# Patient Record
Sex: MALE | Race: WHITE | NOT HISPANIC OR LATINO | URBAN - METROPOLITAN AREA
[De-identification: names, ages, dates, MRNs, and addresses within clinical notes are randomized per-mention and may not be internally consistent; named-entity substitution may affect disease eponyms.]

---

## 2019-01-12 ENCOUNTER — EMERGENCY (EMERGENCY)
Facility: HOSPITAL | Age: 24
LOS: 1 days | Discharge: HOME | End: 2019-01-12
Admitting: EMERGENCY MEDICINE
Payer: COMMERCIAL

## 2019-01-12 VITALS
DIASTOLIC BLOOD PRESSURE: 81 MMHG | RESPIRATION RATE: 16 BRPM | OXYGEN SATURATION: 97 % | SYSTOLIC BLOOD PRESSURE: 136 MMHG | TEMPERATURE: 99 F | HEART RATE: 97 BPM

## 2019-01-12 DIAGNOSIS — R13.10 DYSPHAGIA, UNSPECIFIED: ICD-10-CM

## 2019-01-12 DIAGNOSIS — J02.9 ACUTE PHARYNGITIS, UNSPECIFIED: ICD-10-CM

## 2019-01-12 DIAGNOSIS — R50.9 FEVER, UNSPECIFIED: ICD-10-CM

## 2019-01-12 DIAGNOSIS — R09.81 NASAL CONGESTION: ICD-10-CM

## 2019-01-12 LAB — S PYO AG SPEC QL IA: NEGATIVE — SIGNIFICANT CHANGE UP

## 2019-01-12 PROCEDURE — 99283 EMERGENCY DEPT VISIT LOW MDM: CPT

## 2019-01-12 RX ORDER — DEXAMETHASONE 0.5 MG/5ML
10 ELIXIR ORAL ONCE
Qty: 0 | Refills: 0 | Status: COMPLETED | OUTPATIENT
Start: 2019-01-12 | End: 2019-01-12

## 2019-01-12 RX ADMIN — Medication 10 MILLIGRAM(S): at 14:03

## 2019-01-12 NOTE — ED PROVIDER NOTE - PROGRESS NOTE DETAILS
rapid strep negative. likely mono. advised patient to complete augmentin as prescribed and follow up with PCP in 2 days.

## 2019-01-12 NOTE — ED PROVIDER NOTE - MEDICAL DECISION MAKING DETAILS
patient with URI symptoms on augmentin x 5 days with BL throat pain x 2 days. will do rapid strep, give decadron.

## 2019-01-12 NOTE — ED ADULT NURSE NOTE - NSIMPLEMENTINTERV_GEN_ALL_ED
Implemented All Universal Safety Interventions:  [place] to call system. Call bell, personal items and telephone within reach. Instruct patient to call for assistance. Room bathroom lighting operational. Non-slip footwear when patient is off stretcher. Physically safe environment: no spills, clutter or unnecessary equipment. Stretcher in lowest position, wheels locked, appropriate side rails in place.

## 2019-01-12 NOTE — ED PROVIDER NOTE - OBJECTIVE STATEMENT
Patient presents with nasal congestion, ear fullness, fever x 5 days. had been started on augmentin 5 days ago by his PCP. fevers have resolved but BL throat pain started 2 days ago. able to tolerate PO's, denies cough, SOB, palpitations, h/a.

## 2019-01-12 NOTE — ED ADULT NURSE NOTE - CHPI ED NUR SYMPTOMS NEG
no chills/no bleeding gums/no syncope/no loss of consciousness/no nausea/no numbness/no fever/no weakness/no vomiting

## 2019-01-12 NOTE — ED PROVIDER NOTE - THROAT FINDINGS
BL tonsilar edema, no kissing tonsils/uvula midline/OROPHARYNGEAL EXUDATE/THROAT RED/TONSILLAR SWELLING

## 2020-09-14 NOTE — ED PROVIDER NOTE - MOUTH NORMAL
[facility] - Ophthalmology  Ophthalmology  [address], [address]  [place] [address]  Phone: [phone]  Fax:   Follow Up Time: Urgent     normal mucosa

## 2022-06-13 ENCOUNTER — EMERGENCY (EMERGENCY)
Facility: HOSPITAL | Age: 27
LOS: 1 days | Discharge: HOME | End: 2022-06-13
Attending: EMERGENCY MEDICINE | Admitting: EMERGENCY MEDICINE
Payer: MEDICAID

## 2022-06-13 VITALS
SYSTOLIC BLOOD PRESSURE: 120 MMHG | OXYGEN SATURATION: 97 % | WEIGHT: 149.91 LBS | RESPIRATION RATE: 15 BRPM | TEMPERATURE: 100 F | HEIGHT: 70 IN | DIASTOLIC BLOOD PRESSURE: 74 MMHG | HEART RATE: 89 BPM

## 2022-06-13 VITALS — TEMPERATURE: 100 F

## 2022-06-13 LAB
FLUAV H1 2009 PAND RNA SPEC QL NAA+PROBE: SIGNIFICANT CHANGE UP
FLUAV H1 RNA SPEC QL NAA+PROBE: SIGNIFICANT CHANGE UP
FLUAV H3 RNA SPEC QL NAA+PROBE: SIGNIFICANT CHANGE UP
FLUAV SUBTYP SPEC NAA+PROBE: SIGNIFICANT CHANGE UP
FLUBV RNA SPEC QL NAA+PROBE: SIGNIFICANT CHANGE UP
HIV 1 & 2 AB SERPL IA.RAPID: SIGNIFICANT CHANGE UP
RAPID RVP RESULT: DETECTED
RV+EV RNA SPEC QL NAA+PROBE: DETECTED
SARS-COV-2 RNA SPEC QL NAA+PROBE: SIGNIFICANT CHANGE UP

## 2022-06-13 PROCEDURE — 99284 EMERGENCY DEPT VISIT MOD MDM: CPT

## 2022-06-13 RX ORDER — CEFTRIAXONE 500 MG/1
500 INJECTION, POWDER, FOR SOLUTION INTRAMUSCULAR; INTRAVENOUS ONCE
Refills: 0 | Status: COMPLETED | OUTPATIENT
Start: 2022-06-13 | End: 2022-06-13

## 2022-06-13 RX ORDER — ACETAMINOPHEN 500 MG
650 TABLET ORAL ONCE
Refills: 0 | Status: COMPLETED | OUTPATIENT
Start: 2022-06-13 | End: 2022-06-13

## 2022-06-13 RX ORDER — MICONAZOLE NITRATE 2 %
1 CREAM (GRAM) TOPICAL
Qty: 10 | Refills: 0
Start: 2022-06-13 | End: 2022-06-19

## 2022-06-13 RX ADMIN — Medication 650 MILLIGRAM(S): at 19:26

## 2022-06-13 RX ADMIN — CEFTRIAXONE 500 MILLIGRAM(S): 500 INJECTION, POWDER, FOR SOLUTION INTRAMUSCULAR; INTRAVENOUS at 19:26

## 2022-06-13 NOTE — ED ADULT NURSE NOTE - OBJECTIVE STATEMENT
Patient presents with multiple complaints: C/o productive cough (yellow greenish phlegm), sinus pain, nasal congestion, rhinorrhea, noticed rash to the left armpit yesterday.

## 2022-06-13 NOTE — ED PROVIDER NOTE - OBJECTIVE STATEMENT
27yo M hx of gonorrhea, treated in past, presents with cough productive of yellow to green sputum x3wks without associated fever or chills.  No sore throat.  States he also wants STI testing and empiric treatment for gonorrhea and chlamydia as he heard that a female sexual partner of his said she got gonorrhea from him.  No penile discharge.  No dysuria or testicular pain.  Pt states that after drinking etoh heavily, he had a day of nausea, vomiting, and diarrhea 3d ago, but this has since resolved.  No abd pain.  Vaccinated for COVID.  Pt also reports red itchy rash in L armpit which started after using an old deodorant.

## 2022-06-13 NOTE — ED PROVIDER NOTE - NS ED ROS FT
Constitutional:  No fever, No chills, No night sweats  Eyes:  No visual changes, No discharge, No redness  ENMT:  No epistaxis, no nasal congestion, no throat pain, no difficulty swallowing  CV:  No chest pain, No palpitations, No peripheral edema  Resp:  +cough, No shortness of breath  GI:  No abdominal pain, No vomiting, No diarrhea  MSK:  No neck pain or stiffness, No joint swelling or pain, No back pain  Neuro: no loss of consciousness, no gait abnormality, no headache, no sensory deficits, and no weakness.  Skin:  No abrasions, no lesions, no rashes  Psych:  No known mental health issues

## 2022-06-13 NOTE — ED ADULT TRIAGE NOTE - CHIEF COMPLAINT QUOTE
productive cough, chest congestion, sinus congestion, and an axillary rash x 2-3 weeks productive cough, chest congestion, sinus congestion, and an axillary rash x 2-3 weeks, also requesting std/sti testing

## 2022-06-13 NOTE — ED PROVIDER NOTE - PHYSICAL EXAMINATION
Constitutional:  Well appearing, awake, alert, oriented to person, place, time/situation and in no apparent distress.  HEENT: Airway patent, Nasal mucosa clear. Mouth with normal mucosa. no tonsillar swelling or exudates.  Eyes: Clear bilaterally, pupils equal, round and reactive to light.  Cardiac: Normal rate, regular rhythm.  Respiratory: Breath sounds clear and equal bilaterally.  GI: Abdomen soft, non-tender, no guarding.  MSK: Spine appears normal, range of motion is not limited, no muscle or joint tenderness  Neuro: Alert and oriented, no focal deficits, no motor or sensory deficits.  Skin: Skin normal color for race, warm, dry and intact. blanching erythematous macular rash to L axilla; no scaling, no serpiginous border, no ulcerated lesions

## 2022-06-13 NOTE — ED PROVIDER NOTE - CLINICAL SUMMARY MEDICAL DECISION MAKING FREE TEXT BOX
25yo M hx of gonorrhea, treated in past, presents with numerous complaints including:  -productive cough for 3wks; likely viral URI, but duration of symptoms concerning for bacterial superinfection.  will give PO abx.  will send viral panel.  -rash to L axilla which started after using old deodorant; likely contact dermatitis given isolated area affected, but possible fungal component as well. pt has been using topical cortisone without improvement. told to hold deodorant entirely until rash resolves. will give topical antifungal. instructed to keep area clean and dry.  -request for STI testing and treatment. Will send gc/chlamydia, give IM ceftriaxone, and dc w PO doxy (which will cover respiratory infection as well). Will send and syphilis testing as well; pt wants to wait for results before syphilis treatment.

## 2022-06-13 NOTE — ED ADULT NURSE NOTE - CHIEF COMPLAINT QUOTE
productive cough, chest congestion, sinus congestion, and an axillary rash x 2-3 weeks, also requesting std/sti testing

## 2022-06-13 NOTE — ED PROVIDER NOTE - PATIENT PORTAL LINK FT
You can access the FollowMyHealth Patient Portal offered by [facility] by registering at the following website: [email or web address]. By joining [employer]’s FollowMyHealth portal, you will also be able to view your health information using other applications (apps) compatible with our system.

## 2022-06-13 NOTE — ED PROVIDER NOTE - NSFOLLOWUPINSTRUCTIONS_ED_ALL_ED_FT
Acute Bronchitis, Adult    A comparison between normal and swollen bronchi air tubes in the lungs.   Acute bronchitis is when air tubes in the lungs (bronchi) suddenly get swollen. The condition can make it hard for you to breathe. In adults, acute bronchitis usually goes away within 2 weeks. A cough caused by bronchitis may last up to 3 weeks. Smoking, allergies, and asthma can make the condition worse.      What are the causes?    This condition is caused by:  •Cold and flu viruses. The most common cause of this condition is the virus that causes the common cold.       •Bacteria.     •Substances that irritate the lungs, including:   •Smoke from cigarettes and other types of tobacco.      •Dust and pollen.       •Fumes from chemicals, gases, or burned fuel.      •Other materials that pollute indoor or outdoor air.         •Close contact with someone who has acute bronchitis.        What increases the risk?    The following factors may make you more likely to develop this condition:  •A weak body's defense system. This is also called the immune system.       •Any condition that affects your lungs and breathing, such as asthma.        What are the signs or symptoms?    Symptoms of this condition include:  •A cough.      •Coughing up clear, yellow, or green mucus.      •Wheezing.      •Having too much mucus in your lungs (chest congestion).      •Shortness of breath.      •A fever.      •Chills.      •Body aches.      •A sore throat.        How is this treated?    Acute bronchitis may go away over time without treatment. Your doctor may recommend:  •Drinking more fluids.       •Using a device that gets medicine into your lungs (inhaler).      •Using a vaporizer or a humidifier. These are machines that add water or moisture to the air. This helps with coughing and poor breathing.      •Taking a medicine for fever.      •Taking a medicine that thins mucus and clears congestion.      •Taking a medicine that prevents or stops coughing.        Follow these instructions at home:    Activity     •Get a lot of rest.      •Return to your normal activities as told by your doctor. Ask your doctor what activities are safe for you.        Lifestyle   A comparison of three sample cups showing dark yellow, yellow, and pale yellow pee.   •Drink enough fluid to keep your pee (urine) pale yellow.      • Do not drink alcohol.     • Do not use any products that contain nicotine or tobacco, such as cigarettes, e-cigarettes, and chewing tobacco. If you need help quitting, ask your doctor. Be aware that:  •Your bronchitis will get worse if you smoke or breathe in other people's smoke (secondhand smoke).      •Your lungs will heal faster if you quit smoking.        General instructions     •Take over-the-counter and prescription medicines only as told by your doctor.      •Use an inhaler, cool mist vaporizer, or humidifier as told by your doctor.      •Rinse your mouth often with salt water. To make salt water, dissolve ½–1 tsp (3–6 g) of salt in 1 cup (237 mL) of warm water.      •Take two teaspoons of honey at bedtime. This helps lessen your coughing at night.      •Keep all follow-up visits as told by your doctor. This is important.        How is this prevented?  Washing hands with soap and water.   To lower your risk of getting this condition again:  •Wash your hands often with soap and water. If you cannot use soap and water, use hand .      •Avoid contact with people who have cold symptoms.      •Try not to touch your mouth, nose, or eyes with your hands.      •Make sure to get the flu shot every year.        Contact a doctor if:    •Your symptoms do not get better in 2 weeks.      •You vomit more than once or twice.     •You have symptoms of loss of fluid from your body (dehydration). These include:   •Dark pee.      •Dry skin or eyes.      •Increased thirst.       •Headaches.       •Confusion.      •Muscle cramps.          Get help right away if:    •You cough up blood.      •You have chest pain.      •You have very bad shortness of breath.      •You become dehydrated.      •You faint or keep feeling like you are going to faint.      •You have a very bad headache.      •Your fever or chills get worse.      These symptoms may be an emergency. Get help right away. Call your local emergency services (911 in the [place]    • Do not wait to see if the symptoms will go away.       • Do not drive yourself to the hospital.         Summary    •Acute bronchitis is when air tubes in the lungs (bronchi) suddenly get swollen. In adults, acute bronchitis usually goes away within 2 weeks.      •Take over-the-counter and prescription medicines only as told by your doctor.      •Drink enough fluid to keep your pee (urine) pale yellow.      •Contact a doctor if your symptoms do not improve after 2 weeks of treatment.      •Get help right away if you cough up blood, faint, or have chest pain or shortness of breath.      This information is not intended to replace advice given to you by your health care provider. Make sure you discuss any questions you have with your health care provider.        Contact Dermatitis      Dermatitis is redness, soreness, and swelling (inflammation) of the skin. Contact dermatitis is a reaction to something that touches the skin.    There are two types of contact dermatitis:  •Irritant contact dermatitis. This happens when something bothers (irritates) your skin, like soap.      •Allergic contact dermatitis. This is caused when you are exposed to something that you are allergic to, such as poison ivy.        What are the causes?  •Common causes of irritant contact dermatitis include:  •Makeup.      •Soaps.      •Detergents.      •Bleaches.      •Acids.      •Metals, such as nickel.      •Common causes of allergic contact dermatitis include:  •Plants.      •Chemicals.      •Jewelry.      •Latex.      •Medicines.      •Preservatives in products, such as clothing.          What increases the risk?    •Having a job that exposes you to things that bother your skin.      •Having asthma or eczema.        What are the signs or symptoms?     Symptoms may happen anywhere the irritant has touched your skin. Symptoms include:  •Dry or flaky skin.      •Redness.      •Cracks.      •Itching.      •Pain or a burning feeling.      •Blisters.      •Blood or clear fluid draining from skin cracks.      With allergic contact dermatitis, swelling may occur. This may happen in places such as the eyelids, mouth, or genitals.      How is this treated?  •This condition is treated by checking for the cause of the reaction and protecting your skin. Treatment may also include:  •Steroid creams, ointments, or medicines.      •Antibiotic medicines or other ointments, if you have a skin infection.      •Lotion or medicines to help with itching.      •A bandage (dressing).          Follow these instructions at home:    Skin care     •Moisturize your skin as needed.      •Put cool cloths on your skin.      •Put a baking soda paste on your skin. Stir water into baking soda until it looks like a paste.      • Do not scratch your skin.      •Avoid having things rub up against your skin.      •Avoid the use of soaps, perfumes, and dyes.      Medicines     •Take or apply over-the-counter and prescription medicines only as told by your doctor.      •If you were prescribed an antibiotic medicine, take or apply it as told by your doctor. Do not stop using it even if your condition starts to get better.      Bathing   •Take a bath with:  •Epsom salts.      •Baking soda.      •Colloidal oatmeal.        •Bathe less often.      •Bathe in warm water. Avoid using hot water.      Bandage care     •If you were given a bandage, change it as told by your health care provider.      •Wash your hands with soap and water before and after you change your bandage. If soap and water are not available, use hand .      General instructions   •Avoid the things that caused your reaction. If you do not know what caused it, keep a journal. Write down:  •What you eat.      •What skin products you use.      •What you drink.      •What you wear in the area that has symptoms. This includes jewelry.      •Check the affected areas every day for signs of infection. Check for:  •More redness, swelling, or pain.      •More fluid or blood.      •Warmth.      •Pus or a bad smell.        •Keep all follow-up visits as told by your doctor. This is important.        Contact a doctor if:    •You do not get better with treatment.      •Your condition gets worse.    •You have signs of infection, such as:  •More swelling.      •Tenderness.      •More redness.      •Soreness.      •Warmth.        •You have a fever.      •You have new symptoms.        Get help right away if:    •You have a very bad headache.      •You have neck pain.      •Your neck is stiff.      •You throw up (vomit).      •You feel very sleepy.      •You see red streaks coming from the area.      •Your bone or joint near the area hurts after the skin has healed.      •The area turns darker.      •You have trouble breathing.        Summary    •Dermatitis is redness, soreness, and swelling of the skin.      •Symptoms may occur where the irritant has touched you.      •Treatment may include medicines and skin care.      •If you do not know what caused your reaction, keep a journal.      •Contact a doctor if your condition gets worse or you have signs of infection.      This information is not intended to replace advice given to you by your health care provider. Make sure you discuss any questions you have with your health care provider.          Sexually Transmitted Diseases    WHAT YOU NEED TO KNOW:    A sexually transmitted disease (STD) means signs or symptoms of a sexually transmitted infection (STI) have developed. An STI happens when bacteria or a virus are spread through oral, genital, or anal sex. Some examples of STDs are HIV, chlamydia, syphilis, and gonorrhea.    DISCHARGE INSTRUCTIONS:    Return to the emergency department if:   •You have genital swelling or pain, or unusual bleeding.      •You have joint pain, a rash, swollen lymph nodes, or night sweats.      •You have severe abdominal pain.      Call your doctor if:   •You have a fever.      •Your symptoms do not go away or get worse, even after treatment.      •You have bleeding or pain during sex.      •You or your female partner may be pregnant.      •You have questions or concerns about your condition or care.      Medicines: You may need any of the following:   •Antibiotics may be given for a bacterial infection.      •Antivirals may be given for a viral infection.      •Antifungals may be given for a fungal infection, such as a yeast infection.      •Take your medicine as directed. Contact your healthcare provider if you think your medicine is not helping or if you have side effects. Tell him of her if you are allergic to any medicine. Keep a list of the medicines, vitamins, and herbs you take. Include the amounts, and when and why you take them. Bring the list or the pill bottles to follow-up visits. Carry your medicine list with you in case of an emergency.      Help prevent the spread of an STI: Ask your healthcare provider for more information about the following safe sex practices:  •Use a male or female condom during sex. This includes oral, genital, or anal sex. Use a new condom each time. Condoms help prevent pregnancy and STIs. Use latex condoms, if possible. Lambskin (also called sheepskin or natural membrane) condoms do not protect against STIs. A polyurethane condom can be used if you or your partner is allergic to latex. Condoms should be used with a second form of birth control to help prevent pregnancy and STIs. Do not use male and female condoms together.      •Do not have sex with someone who has an STI or STD. This includes oral and anal sex.      •Limit sex partners. Ask about your partner's sex history before you have sex.      •Get screened regularly if you are sexually active. Common tests include chlamydia, gonorrhea, HIV, hepatitis, and syphilis.      •Tell your sex partners if you have an STI. Your partners may need to be tested and treated. Do not have sex while you are being treated for an STI. Do not have sex with a partner who is being treated.      •Ask about medicines to lower your risk for some STIs: ?Vaccines can help protect you from hepatitis A, hepatitis B, and the human papillomavirus (HPV). The HPV vaccine is usually given at 11 years, but it may be given through 26 years to both females and males. Your provider can give you more information on vaccines to prevent STIs.      ?Pre-exposure prophylaxis (PrEP) may be given if you are at high risk for HIV. PrEP is taken every day to prevent the virus from fully infecting the body.      •If you are a woman, do not douche. Douching upsets the normal balance of bacteria found in your vagina. It does not prevent or clear up vaginal infections.      Follow up with your doctor as directed: You may need more tests. If you have an STD, you may need immediate or ongoing treatment. Your doctor may also refer you to a specialist who can provide specific treatment. Write down your questions so you remember to ask them during your visits.

## 2022-06-14 LAB
C TRACH RRNA SPEC QL NAA+PROBE: SIGNIFICANT CHANGE UP
N GONORRHOEA RRNA SPEC QL NAA+PROBE: SIGNIFICANT CHANGE UP
T PALLIDUM AB TITR SER: NEGATIVE — SIGNIFICANT CHANGE UP

## 2022-06-15 DIAGNOSIS — R05.8 OTHER SPECIFIED COUGH: ICD-10-CM

## 2022-06-15 DIAGNOSIS — Z20.822 CONTACT WITH AND (SUSPECTED) EXPOSURE TO COVID-19: ICD-10-CM

## 2022-06-15 DIAGNOSIS — R19.7 DIARRHEA, UNSPECIFIED: ICD-10-CM

## 2022-06-15 DIAGNOSIS — R11.2 NAUSEA WITH VOMITING, UNSPECIFIED: ICD-10-CM

## 2022-08-30 ENCOUNTER — EMERGENCY (EMERGENCY)
Facility: HOSPITAL | Age: 27
LOS: 1 days | Discharge: HOME | End: 2022-08-30
Attending: EMERGENCY MEDICINE | Admitting: EMERGENCY MEDICINE

## 2022-08-30 VITALS
DIASTOLIC BLOOD PRESSURE: 84 MMHG | SYSTOLIC BLOOD PRESSURE: 135 MMHG | RESPIRATION RATE: 15 BRPM | TEMPERATURE: 98 F | HEART RATE: 96 BPM | WEIGHT: 154.32 LBS | OXYGEN SATURATION: 99 % | HEIGHT: 70 IN

## 2022-08-30 DIAGNOSIS — J32.9 CHRONIC SINUSITIS, UNSPECIFIED: ICD-10-CM

## 2022-08-30 DIAGNOSIS — R09.81 NASAL CONGESTION: ICD-10-CM

## 2022-08-30 PROCEDURE — 99283 EMERGENCY DEPT VISIT LOW MDM: CPT

## 2022-08-30 RX ORDER — MUPIROCIN 20 MG/G
1 OINTMENT TOPICAL
Qty: 15 | Refills: 0
Start: 2022-08-30

## 2022-08-30 NOTE — ED PROVIDER NOTE - PATIENT PORTAL LINK FT
You can access the FollowMyHealth Patient Portal offered by [facility] by registering at the following website: [email or web address]. By joining [employer]’s FollowMyHealth portal, you will also be able to view your health information using other applications (apps) compatible with our system.

## 2022-08-30 NOTE — ED ADULT TRIAGE NOTE - CHIEF COMPLAINT QUOTE
patient walk in c/o sinus infection x3 weeks; has not seen an ENT or MD for this; not on any medication; also c/o "a bump inside my right nostril that keeps coming back"

## 2022-08-30 NOTE — ED PROVIDER NOTE - NS ED ROS FT
· CONSTITUTIONAL: no fever and no chills.  · RESPIRATORY: no sob, no cough  · HEENT: +nasal congestion, +sinus pressure  · GASTROINTESTINAL: no abdominal pain, no nausea and no vomiting.  · MUSCULOSKELETAL: no musculoskeletal pain  · SKIN: no abrasions, no rashes  · NEURO: no headache, no weakness  All other systems negative

## 2022-08-30 NOTE — ED PROVIDER NOTE - PHYSICAL EXAMINATION
VITAL SIGNS: I have reviewed nursing notes and confirm.  CONSTITUTIONAL: Well-developed; well-nourished; in no acute distress.  SKIN: No rash on visible skin  HEAD: Normocephalic; atraumatic.  EYES: EOM intact; conjunctiva and sclera clear.  ENT: nose appears normal, right septum with excoration  NECK: Supple, no cervical lymphadenopathy  CARD: S1, S2 normal; no murmurs, gallops, or rubs. Regular rate and rhythm.  RESP: No wheezes, rales or rhonchi.  EXT: Normal ROM. No clubbing, cyanosis or edema.  NEURO: Alert, oriented. Grossly unremarkable.  PSYCH: Cooperative, appropriate.

## 2022-08-30 NOTE — ED PROVIDER NOTE - NSFOLLOWUPINSTRUCTIONS_ED_ALL_ED_FT
Please use a nasal saline irrigation system (Neilmed) twice daily.  Please take the prescribed antibiotic for 1 week.  Please use the ointment to the right side of the nose or 7-14 days until the lesion in the nose has resolved completely. Please use a nasal saline irrigation system (Neilmed) twice daily.  Please take the prescribed antibiotic for 1 week.  Please use the ointment to the right side of the nose or 7-14 days until the lesion in the nose has resolved completely.    Please follow up with a primary care doctor:    [facility]  [phone]

## 2022-08-30 NOTE — ED PROVIDER NOTE - OBJECTIVE STATEMENT
25yo M presenting complaining of 3 weeks of nasal congestion productive of thick greenish discharge.  He developed pressure between the eyes today.  No fevers.  He has also noticed a bump inside the nose on the right septum that has been present for a few weeks.  He has had sinus infections in the past that have required antibiotics.  He has not tried taking any medications for his current symptoms.

## 2022-08-30 NOTE — ED PROVIDER NOTE - CLINICAL SUMMARY MEDICAL DECISION MAKING FREE TEXT BOX
25yo M with symptoms concerning for bacterial sinusitis given the duration of symptoms.  Nontoxic appearing.  Will treat with a course of augmentin and recommend nasal saline irrigation and will also prescribe mupiricin for possible MRSA infection to the nasal septum.

## 2022-12-14 ENCOUNTER — EMERGENCY (EMERGENCY)
Facility: HOSPITAL | Age: 27
LOS: 1 days | Discharge: HOME | End: 2022-12-14
Attending: EMERGENCY MEDICINE | Admitting: EMERGENCY MEDICINE

## 2022-12-14 VITALS
HEART RATE: 90 BPM | RESPIRATION RATE: 18 BRPM | WEIGHT: 160.06 LBS | TEMPERATURE: 99 F | HEIGHT: 70 IN | OXYGEN SATURATION: 98 % | SYSTOLIC BLOOD PRESSURE: 133 MMHG | DIASTOLIC BLOOD PRESSURE: 78 MMHG

## 2022-12-14 DIAGNOSIS — J02.9 ACUTE PHARYNGITIS, UNSPECIFIED: ICD-10-CM

## 2022-12-14 DIAGNOSIS — R59.0 LOCALIZED ENLARGED LYMPH NODES: ICD-10-CM

## 2022-12-14 LAB
BASOPHILS # BLD AUTO: 0.04 K/UL — SIGNIFICANT CHANGE UP (ref 0–0.2)
BASOPHILS NFR BLD AUTO: 0.5 % — SIGNIFICANT CHANGE UP (ref 0–2)
EOSINOPHIL # BLD AUTO: 0.09 K/UL — SIGNIFICANT CHANGE UP (ref 0–0.5)
EOSINOPHIL NFR BLD AUTO: 1.1 % — SIGNIFICANT CHANGE UP (ref 0–6)
HCT VFR BLD CALC: 46.7 % — SIGNIFICANT CHANGE UP (ref 39–50)
HGB BLD-MCNC: 15.2 G/DL — SIGNIFICANT CHANGE UP (ref 13–17)
HIV 1 & 2 AB SERPL IA.RAPID: SIGNIFICANT CHANGE UP
IMM GRANULOCYTES NFR BLD AUTO: 0.3 % — SIGNIFICANT CHANGE UP (ref 0–0.9)
LYMPHOCYTES # BLD AUTO: 1.87 K/UL — SIGNIFICANT CHANGE UP (ref 1–3.3)
LYMPHOCYTES # BLD AUTO: 23.6 % — SIGNIFICANT CHANGE UP (ref 13–44)
MCHC RBC-ENTMCNC: 31.4 PG — SIGNIFICANT CHANGE UP (ref 27–34)
MCHC RBC-ENTMCNC: 32.5 GM/DL — SIGNIFICANT CHANGE UP (ref 32–36)
MCV RBC AUTO: 96.5 FL — SIGNIFICANT CHANGE UP (ref 80–100)
MONOCYTES # BLD AUTO: 0.66 K/UL — SIGNIFICANT CHANGE UP (ref 0–0.9)
MONOCYTES NFR BLD AUTO: 8.3 % — SIGNIFICANT CHANGE UP (ref 2–14)
NEUTROPHILS # BLD AUTO: 5.25 K/UL — SIGNIFICANT CHANGE UP (ref 1.8–7.4)
NEUTROPHILS NFR BLD AUTO: 66.2 % — SIGNIFICANT CHANGE UP (ref 43–77)
NRBC # BLD: 0 /100 WBCS — SIGNIFICANT CHANGE UP (ref 0–0)
PLATELET # BLD AUTO: 230 K/UL — SIGNIFICANT CHANGE UP (ref 150–400)
RBC # BLD: 4.84 M/UL — SIGNIFICANT CHANGE UP (ref 4.2–5.8)
RBC # FLD: 12.3 % — SIGNIFICANT CHANGE UP (ref 10.3–14.5)
S PYO AG SPEC QL IA: NEGATIVE — SIGNIFICANT CHANGE UP
WBC # BLD: 7.93 K/UL — SIGNIFICANT CHANGE UP (ref 3.8–10.5)
WBC # FLD AUTO: 7.93 K/UL — SIGNIFICANT CHANGE UP (ref 3.8–10.5)

## 2022-12-14 PROCEDURE — 99284 EMERGENCY DEPT VISIT MOD MDM: CPT

## 2022-12-14 NOTE — ED ADULT TRIAGE NOTE - CHIEF COMPLAINT QUOTE
Pt walks in c/o B/L swollen neck lymph nodes for 2 months. Pt denies any additional symptoms. Pt denies difficulty breathing.

## 2022-12-14 NOTE — ED PROVIDER NOTE - PATIENT PORTAL LINK FT
You can access the FollowMyHealth Patient Portal offered by [facility] by registering at the following website: [email or web address]. By joining [employer]’s FollowMyHealth portal, you will also be able to view your health information using other applications (apps) compatible with our system.

## 2022-12-14 NOTE — ED PROVIDER NOTE - OBJECTIVE STATEMENT
28 y/o M with no significant PMHx presents to ED c/o R sided cervical lymphadenopathy and some mild sore throat for 1-2 weeks, unchanged. No fever. Pt endorsing some mild rhinorrhea. No cough and no other painful lymph nodes.

## 2022-12-14 NOTE — ED ADULT NURSE NOTE - OBJECTIVE STATEMENT
Pt walked in c/o of right swollen lymph node and pain when swallowing x 2 weeks. PT A&Ox3 speaking in full clear sentences. No drooling. Airway patent
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
## 2022-12-14 NOTE — ED PROVIDER NOTE - PHYSICAL EXAMINATION
VITAL SIGNS: I have reviewed nursing notes and confirm.  CONSTITUTIONAL: Well-developed; well-nourished; in no acute distress.  SKIN: Skin is warm and dry.  HEAD: Normocephalic; atraumatic.  EYES: Clear bilaterally.  ENT: No tonsilar enlargement or exudates. Midline uvula.   NECK: Right sided cervical lymphadenopathy.    CARD: Regular rate and rhythm.  RESP: No respiratory distress.  ABD: Soft; non-distended; non-tender.  MSK: Normal ROM. No clubbing, cyanosis or edema.  NEURO: Alert, oriented. Grossly unremarkable.  PSYCH: Cooperative, appropriate.

## 2022-12-14 NOTE — ED PROVIDER NOTE - NSFOLLOWUPINSTRUCTIONS_ED_ALL_ED_FT
Lymphadenopathy    WHAT YOU NEED TO KNOW:    Lymphadenopathy is swelling of your lymph nodes. Lymph nodes are small organs that are part of your immune system. The lymph nodes are found throughout your body. They are most easily felt in your neck, under your arms, and near your groin. Lymphadenopathy can occur in one or more areas of your body. It is usually caused by an infection.    DISCHARGE INSTRUCTIONS:    Return to the emergency department if:   •The swollen lymph nodes bleed.      •You have swollen lymph nodes in your neck that affect your breathing or swallowing.      Contact your healthcare provider if:   •You have a fever.      •You have a new swollen and painful lymph node.      •You have a skin rash.      •Your lymph node remains swollen or painful, or it gets bigger.      •Your lymph node has red streaks around it, or the skin around the lymph node is red.      •You have questions or concerns about your condition or care.      Follow up with your doctor as directed: Write down your questions so you remember to ask them during your visits.    Self-care:   •Do not poke or squeeze the swollen lymph nodes.      •Apply heat to the swollen glands. You may use warm compresses, or an electric heating pad set on low.       •Rest as needed. If you have a fever, rest until your temperature returns to normal. Return to your normal daily activities slowly after your fever is gone.

## 2022-12-16 LAB
CULTURE RESULTS: SIGNIFICANT CHANGE UP
SPECIMEN SOURCE: SIGNIFICANT CHANGE UP

## 2022-12-30 ENCOUNTER — EMERGENCY (EMERGENCY)
Facility: HOSPITAL | Age: 27
LOS: 1 days | Discharge: HOME | End: 2022-12-30
Admitting: EMERGENCY MEDICINE
Payer: MEDICAID

## 2022-12-30 VITALS
TEMPERATURE: 98 F | DIASTOLIC BLOOD PRESSURE: 80 MMHG | RESPIRATION RATE: 18 BRPM | HEIGHT: 70 IN | HEART RATE: 113 BPM | OXYGEN SATURATION: 97 % | SYSTOLIC BLOOD PRESSURE: 128 MMHG | WEIGHT: 160.06 LBS

## 2022-12-30 PROCEDURE — 99282 EMERGENCY DEPT VISIT SF MDM: CPT

## 2022-12-30 RX ORDER — MUPIROCIN 20 MG/G
1 OINTMENT TOPICAL
Refills: 0 | Status: DISCONTINUED | OUTPATIENT
Start: 2022-12-30 | End: 2023-01-03

## 2022-12-30 RX ADMIN — MUPIROCIN 1 APPLICATION(S): 20 OINTMENT TOPICAL at 16:52

## 2022-12-30 NOTE — ED PROVIDER NOTE - OBJECTIVE STATEMENT
26 y/o M with no PMH presents complaining of irritation inside of nostrils after sniffing cocaine last week. Patient requesting evaluation and antibiotic ointment for the area. Denies fever, chills.

## 2022-12-30 NOTE — ED PROVIDER NOTE - CLINICAL SUMMARY MEDICAL DECISION MAKING FREE TEXT BOX
28 y/o M presents for nostril irritation in the setting of sniffing cocaine. On exam nasal irritation but no septal hematoma or hole in septum identified. Will give nasal spray for symptom relief.

## 2022-12-30 NOTE — ED ADULT NURSE NOTE - OBJECTIVE STATEMENT
Pt aox3 with steady gait. "I have a sore in my nose and I need that water based antibiotic for it". Denies fevers or nasal drainage. PT states from snorting cocaine. Denies breathing problems

## 2022-12-30 NOTE — ED PROVIDER NOTE - PATIENT PORTAL LINK FT
You can access the FollowMyHealth Patient Portal offered by [facility] by registering at the following website: [email or web address]. By joining [facility]’s FollowMyHealth portal, you will also be able to view your health information using other applications (apps) compatible with our system.

## 2022-12-30 NOTE — ED ADULT TRIAGE NOTE - CHIEF COMPLAINT QUOTE
Pt walk in complaining of nasal sores for one week secondary to snorting cocaine. Denies bleeding or drainage. Pt walk in complaining of nasal sores for one week secondary to snorting cocaine. Denies bleeding or drainage. Denies any illicit drug use PTA.

## 2022-12-30 NOTE — ED ADULT NURSE NOTE - CHIEF COMPLAINT QUOTE
Pt walk in complaining of nasal sores for one week secondary to snorting cocaine. Denies bleeding or drainage. Denies any illicit drug use PTA.

## 2022-12-30 NOTE — ED PROVIDER NOTE - IV ALTEPLASE INCLUSION HIDDEN
----- Message from Liss Yañez MA sent at 10/24/2017 11:32 AM CDT -----  Good morning,     Dr. Brooke is referring this pt. Is there any way that Dr. Braga can see this pt before the first available appt on 11/15 ?   Pt has breast pain where her heart device is implanted, but no longer feels the heart device. She complains of a lump on her nipple that is painful to the touch.    Thanks in advance,     Liss  Ext [phone]   show

## 2023-01-02 DIAGNOSIS — R68.89 OTHER GENERAL SYMPTOMS AND SIGNS: ICD-10-CM

## 2023-02-11 ENCOUNTER — EMERGENCY (EMERGENCY)
Facility: HOSPITAL | Age: 28
LOS: 1 days | Discharge: HOME | End: 2023-02-11
Attending: EMERGENCY MEDICINE | Admitting: EMERGENCY MEDICINE
Payer: MEDICAID

## 2023-02-11 VITALS
HEIGHT: 70 IN | OXYGEN SATURATION: 96 % | SYSTOLIC BLOOD PRESSURE: 135 MMHG | DIASTOLIC BLOOD PRESSURE: 76 MMHG | RESPIRATION RATE: 18 BRPM | HEART RATE: 94 BPM | TEMPERATURE: 98 F

## 2023-02-11 LAB
FLUAV AG NPH QL: SIGNIFICANT CHANGE UP
FLUBV AG NPH QL: SIGNIFICANT CHANGE UP
RSV RNA NPH QL NAA+NON-PROBE: SIGNIFICANT CHANGE UP
SARS-COV-2 RNA SPEC QL NAA+PROBE: SIGNIFICANT CHANGE UP

## 2023-02-11 PROCEDURE — 99284 EMERGENCY DEPT VISIT MOD MDM: CPT

## 2023-02-11 RX ORDER — IBUPROFEN 200 MG
1 TABLET ORAL
Qty: 20 | Refills: 0
Start: 2023-02-11

## 2023-02-11 RX ADMIN — Medication 40 MILLIGRAM(S): at 14:17

## 2023-02-11 NOTE — ED PROVIDER NOTE - OBJECTIVE STATEMENT
Patient reports he woke up 2 days ago with a sore throat. Also developed rhinorrhea and a dry cough. last night lost his voice. Afterwards, tried to vape nicotine, and felt short of breath afterwards. stated he felt like shortness of breath was coming from burning pain and inflammation in his throat, not discomfort in his chest. Took a friend's albuterol inhaler without relief. No fever, cp, ap, n/v/d, rash. States he only vapes commercial nicotine cartridges, no homemade cartridges or oils. No longer feels short of breath.

## 2023-02-11 NOTE — ED ADULT TRIAGE NOTE - CHIEF COMPLAINT QUOTE
Pt walks in c/o sore throat with intermittent shortness of breath for 2 days. Pt states he was smoking marijuana last night when he began to become short of breath, pt took friends asthma inhaler without relief. Pt is a daily vape user.

## 2023-02-11 NOTE — ED PROVIDER NOTE - TEST CONSIDERED BUT NOT PERFORMED
Tests Considered But Not Performed chest x-ray: patient had normal pulmonary exam, respiratory rate, and oxygen saturation

## 2023-02-11 NOTE — ED ADULT NURSE NOTE - OBJECTIVE STATEMENT
pt aox3 with steady gait. c/o sore throat with intermittent shortness of breath for 2 days. Pt states he was smoking marijuana last night when he began to become short of breath, pt took friends asthma inhaler without relief. Pt is a daily vape user.
agitation

## 2023-02-11 NOTE — ED PROVIDER NOTE - WET READ LAUNCH FT
There are no Wet Read(s) to document. Ear Star Wedge Flap Text: The defect edges were debeveled with a #15 blade scalpel.  Given the location of the defect and the proximity to free margins (helical rim) an ear star wedge flap was deemed most appropriate.  Using a sterile surgical marker, the appropriate flap was drawn incorporating the defect and placing the expected incisions between the helical rim and antihelix where possible.  The area thus outlined was incised through and through with a #15 scalpel blade.

## 2023-02-11 NOTE — ED PROVIDER NOTE - CLINICAL SUMMARY MEDICAL DECISION MAKING FREE TEXT BOX
Patient with sore throat, cough, rhinorrhea, temporary shortness of breath. Visible pharyngitis on exam. Will tx with steroids and NSAIDs, mostly likely viral. PE unlikely given other viral symptoms and lack of tachycardia, tachypnea, hypoxia. PERC zero.

## 2023-02-11 NOTE — ED PROVIDER NOTE - NSFOLLOWUPINSTRUCTIONS_ED_ALL_ED_FT
Pharyngitis    WHAT YOU NEED TO KNOW:    Pharyngitis, or sore throat, is inflammation of the tissues and structures in your pharynx (throat). Pharyngitis is most often caused by bacteria or a virus. Other causes include smoking, allergies, or acid reflux.    DISCHARGE INSTRUCTIONS:    Call your local emergency number (911 in the ) if:   •You have trouble breathing or swallowing because your throat is swollen.          Return to the emergency department if:   •You are drooling because it hurts too much to swallow.      •Your fever is higher than 102°F (39°C) or lasts longer than 3 days.      •You are confused.      •You taste blood.      Call your doctor if:   •Your throat pain gets worse.      •You have a painful lump in your throat that does not go away after 5 days.      •Your symptoms do not improve after 5 days.      •You have questions or concerns about your condition or care.      Medicines: Viral pharyngitis will go away on its own without treatment. Your sore throat should start to feel better in 3 to 5 days. You may need any of the following:  •Antibiotics treat a bacterial infection.      •NSAIDs help decrease swelling and pain or fever. This medicine is available with or without a doctor's order. NSAIDs can cause stomach bleeding or kidney problems in certain people. If you take blood thinner medicine, always ask your healthcare provider if NSAIDs are safe for you. Always read the medicine label and follow directions.      •Acetaminophen decreases pain and fever. It is available without a doctor's order. Ask how much to take and how often to take it. Follow directions. Read the labels of all other medicines you are using to see if they also contain acetaminophen, or ask your doctor or pharmacist. Acetaminophen can cause liver damage if not taken correctly.      •Take your medicine as directed. Contact your healthcare provider if you think your medicine is not helping or if you have side effects. Tell your provider if you are allergic to any medicine. Keep a list of the medicines, vitamins, and herbs you take. Include the amounts, and when and why you take them. Bring the list or the pill bottles to follow-up visits. Carry your medicine list with you in case of an emergency.      Manage your symptoms:   •Gargle salt water. Mix ¼ teaspoon salt in an 8 ounce glass of warm water and gargle. Do not swallow. Salt water may help decrease swelling in your throat.      •Drink liquids as directed. You may need to drink more liquids than usual. Liquids may help soothe your throat and prevent dehydration. Ask how much liquid to drink each day and which liquids are best for you.      •Use a cool mist humidifier. This will add moisture to the air and help decrease your cough.      •Soothe your throat. Cough drops, ice, soft foods, or popsicles may help decrease throat pain.      Prevent the spread of pharyngitis: Cover your mouth and nose when you cough or sneeze. Do not share food or drinks. Wash your hands often. Use soap and water. If soap and water are unavailable, use an alcohol-based hand .  Handwashing         Follow up with your doctor as directed: Write down your questions so you remember to ask them during your visits.       © Copyright [employer] 2023           back to top                          © Copyright [employer] 2023

## 2023-02-11 NOTE — ED PROVIDER NOTE - PHYSICAL EXAMINATION
Gen: Well-developed, well-nourished, NAD, HEENT: NCAT, mmm, moderate erythema and edema of posterior pharynx, no tonsillar enlargement, no exudates. normal voice, no trismus  Chest: RRR, nl S1 and S2, no m/r/g. Resp: CTAB, no w/r/r  Abd: nl BS, soft, nt/nd. Ext: Warm, dry

## 2023-02-11 NOTE — ED ADULT NURSE NOTE - CHPI ED NUR SYMPTOMS NEG
no sob on arrival/no body aches/no chest pain/no chills/no cough/no diaphoresis/no edema/no fever/no headache/no hemoptysis/no shortness of breath/no wheezing

## 2023-02-11 NOTE — ED PROVIDER NOTE - PATIENT PORTAL LINK FT
You can access the FollowMyHealth Patient Portal offered by [facility] by registering at the following website: [email or web address]. By joining [employer]’s FollowMyHealth portal, you will also be able to view your health information using other applications (apps) compatible with our system.

## 2023-02-14 DIAGNOSIS — J02.9 ACUTE PHARYNGITIS, UNSPECIFIED: ICD-10-CM

## 2023-02-14 DIAGNOSIS — R06.02 SHORTNESS OF BREATH: ICD-10-CM

## 2023-02-14 DIAGNOSIS — Z20.822 CONTACT WITH AND (SUSPECTED) EXPOSURE TO COVID-19: ICD-10-CM

## 2023-02-14 DIAGNOSIS — R05.8 OTHER SPECIFIED COUGH: ICD-10-CM

## 2023-04-21 ENCOUNTER — EMERGENCY (EMERGENCY)
Facility: HOSPITAL | Age: 28
LOS: 1 days | Discharge: HOME | End: 2023-04-21
Attending: EMERGENCY MEDICINE | Admitting: EMERGENCY MEDICINE
Payer: MEDICAID

## 2023-04-21 VITALS
HEART RATE: 94 BPM | DIASTOLIC BLOOD PRESSURE: 85 MMHG | OXYGEN SATURATION: 96 % | RESPIRATION RATE: 18 BRPM | TEMPERATURE: 98 F | SYSTOLIC BLOOD PRESSURE: 118 MMHG

## 2023-04-21 PROCEDURE — 99284 EMERGENCY DEPT VISIT MOD MDM: CPT

## 2023-04-21 RX ORDER — POLYMYXIN B SULF/TRIMETHOPRIM 10000-1/ML
1 DROPS OPHTHALMIC (EYE)
Qty: 1 | Refills: 0
Start: 2023-04-21 | End: 2023-04-27

## 2023-04-21 NOTE — ED ADULT NURSE NOTE - OBJECTIVE STATEMENT
Pt is a 27y male presenting w/ b/l eye redness x2-3days. Pt also reports mild swelling, itching "crusts", and tearing, some blurry vision.

## 2023-04-21 NOTE — ED PROVIDER NOTE - NSFOLLOWUPINSTRUCTIONS_ED_ALL_ED_FT
Conjunctivitis    WHAT YOU NEED TO KNOW:    Conjunctivitis, or pink eye, is inflammation of your conjunctiva. The conjunctiva is a thin tissue that covers the front of your eye and the back of your eyelids. The conjunctiva helps protect your eye and keep it moist. Conjunctivitis may be caused by bacteria, allergies, or a virus. If your conjunctivitis is caused by bacteria, it may get better on its own in about 7 days. Viral conjunctivitis can last up to 3 weeks. Conjunctivitis         DISCHARGE INSTRUCTIONS:    Return to the emergency department if:     You have worsening eye pain.       The swelling in your eye gets worse, even after treatment.       Your vision suddenly becomes worse or you cannot see at all.    Contact your healthcare provider if:     You develop a fever and ear pain.      You have tiny bumps or spots of blood on your eye.      You have questions or concerns about your condition or care.    Manage your symptoms:     Apply a cool compress. Wet a washcloth with cold water and place it on your eye. This will help decrease itching and irritation.      Do not wear contact lenses. They can irritate your eye. Throw away the pair you are using and ask when you can wear them again. Use a new pair of lenses when your healthcare provider says it is okay.       Avoid irritants. Stay away from smoke filled areas. Shield your eyes from wind and sun.       Flush your eye. You may need to flush your eye with saline to help decrease your symptoms. Ask for more information on how to flush your eye.     Medicines: Treatment depends on what is causing your conjunctivitis. You maybe given any of the following:    Allergy medicine helps decrease itchy, red, swollen eyes caused by allergies. It may be given as a pill, eye drops, or nasal spray.      Antibiotics may be needed if your conjunctivitis is caused by bacteria. This medicine may be given as a pill, eye drops, or eye ointment.      Take your medicine as directed. Contact your healthcare provider if you think your medicine is not helping or if you have side effects. Tell him or her if you are allergic to any medicine. Keep a list of the medicines, vitamins, and herbs you take. Include the amounts, and when and why you take them. Bring the list or the pill bottles to follow-up visits. Carry your medicine list with you in case of an emergency.    Prevent the spread of conjunctivitis:     Wash your hands with soap and water often. Wash your hands before and after you touch your eyes. Also wash your hands before you prepare or eat food and after you use the bathroom or change a diaper.      Avoid allergens. Try to avoid the things that cause your allergies, such as pets, dust, or grass.       Avoid contact with others. Do not share towels or washcloths. Try to stay away from others as much as possible. Ask when you can return to work or school.       Throw away eye makeup. The bacteria that caused your conjunctivitis can stay in eye makeup. Throw away mascara and other eye makeup.

## 2023-04-21 NOTE — ED ADULT NURSE NOTE - CHIEF COMPLAINT QUOTE
pt walked in complaining of penile discharge as well as discharge from bilateral eyes. seen at [facility] several days for sti and is actively taking doxycycline.

## 2023-04-21 NOTE — ED PROVIDER NOTE - OBJECTIVE STATEMENT
27-year-old male with complaint of bilateral crusting discharge and redness of the bilateral eyes.  Currently he is being medicated for GC and chlamydia He was given ceftriaxone IM and currently is taking doxycycline.  He is concerned because on the same day that his penile discharge symptoms started he also got bilateral eye crusting tearing and redness.  No known exposure to foreign body, does not wear contact lenses, uncorrected vision at baseline 2020.

## 2023-04-21 NOTE — ED ADULT TRIAGE NOTE - CHIEF COMPLAINT QUOTE
pt walked in complaining of penile discharge as well as discharge from bilateral eyes. seen at [facility] several days for sti and is actively taking doxycycline.

## 2023-04-21 NOTE — ED PROVIDER NOTE - IV ALTEPLASE DOOR HIDDEN
If you have worsening back pain despite the use of pain medications or develop any progressive symptoms including fever, abdominal pain, numbness or weakness in your legs, difficulty with bowel or bladder control return to the emergency department.  
show

## 2023-04-21 NOTE — ED PROVIDER NOTE - PHYSICAL EXAMINATION
VSS in NAD non toxic appearing   Bilateral eyes with crusting and conjunctival injection. PERRL EOMI no proptosis, no periorbital changes, no swelling no erythema  CONSTITUTIONAL: Well-appearing; well-nourished; in no apparent distress.   HEAD: Normocephalic; atraumatic.   EYES:  clear bilaterally  ENT: airway patent  Resp breathing comfortably with no distress  PSYCHOLOGICAL: The patient’s mood and manner are appropriate.

## 2023-04-21 NOTE — ED PROVIDER NOTE - NSFOLLOWUPCLINICS_GEN_ALL_ED_FT
[facility] - Ophthalmology Clinic  Ophthalmology  [address], [address]st [place] [address]  Phone: [phone]  Fax:     [facility]  Ophthalmology  [address] [place] [id number]  Phone: [phone]  Fax:

## 2023-04-21 NOTE — ED PROVIDER NOTE - PATIENT PORTAL LINK FT
You can access the FollowMyHealth Patient Portal offered by [facility] by registering at the following website: [email or web address]. By joining [employer]’s FollowMyHealth portal, you will also be able to view your health information using other applications (apps) compatible with our system.

## 2023-04-21 NOTE — ED PROVIDER NOTE - CLINICAL SUMMARY MEDICAL DECISION MAKING FREE TEXT BOX
Conjunctivitis, will prescribe topical eyedrops, advised to stay on STD meds, eye clinic follow-up in 1 week.

## 2023-04-24 ENCOUNTER — EMERGENCY (EMERGENCY)
Facility: HOSPITAL | Age: 28
LOS: 1 days | Discharge: HOME | End: 2023-04-24
Attending: EMERGENCY MEDICINE | Admitting: EMERGENCY MEDICINE
Payer: MEDICAID

## 2023-04-24 VITALS
HEART RATE: 86 BPM | TEMPERATURE: 99 F | DIASTOLIC BLOOD PRESSURE: 80 MMHG | SYSTOLIC BLOOD PRESSURE: 117 MMHG | RESPIRATION RATE: 18 BRPM | OXYGEN SATURATION: 98 %

## 2023-04-24 VITALS
OXYGEN SATURATION: 99 % | HEIGHT: 69 IN | DIASTOLIC BLOOD PRESSURE: 72 MMHG | RESPIRATION RATE: 18 BRPM | HEART RATE: 88 BPM | TEMPERATURE: 99 F | WEIGHT: 134.92 LBS | SYSTOLIC BLOOD PRESSURE: 126 MMHG

## 2023-04-24 LAB — HIV 1 & 2 AB SERPL IA.RAPID: SIGNIFICANT CHANGE UP

## 2023-04-24 PROCEDURE — 99284 EMERGENCY DEPT VISIT MOD MDM: CPT

## 2023-04-24 RX ORDER — POLYMYXIN B SULF/TRIMETHOPRIM 10000-1/ML
1 DROPS OPHTHALMIC (EYE)
Qty: 1 | Refills: 0
Start: 2023-04-24 | End: 2023-04-30

## 2023-04-24 RX ORDER — METRONIDAZOLE 500 MG
2000 TABLET ORAL ONCE
Refills: 0 | Status: COMPLETED | OUTPATIENT
Start: 2023-04-24 | End: 2023-04-24

## 2023-04-24 RX ORDER — PENICILLIN G BENZATHINE 1200000 [IU]/2ML
2.4 INJECTION, SUSPENSION INTRAMUSCULAR ONCE
Refills: 0 | Status: COMPLETED | OUTPATIENT
Start: 2023-04-24 | End: 2023-04-24

## 2023-04-24 RX ORDER — CEFTRIAXONE 500 MG/1
500 INJECTION, POWDER, FOR SOLUTION INTRAMUSCULAR; INTRAVENOUS ONCE
Refills: 0 | Status: COMPLETED | OUTPATIENT
Start: 2023-04-24 | End: 2023-04-24

## 2023-04-24 RX ADMIN — CEFTRIAXONE 500 MILLIGRAM(S): 500 INJECTION, POWDER, FOR SOLUTION INTRAMUSCULAR; INTRAVENOUS at 13:28

## 2023-04-24 RX ADMIN — PENICILLIN G BENZATHINE 2.4 MILLION UNIT(S): 1200000 INJECTION, SUSPENSION INTRAMUSCULAR at 13:29

## 2023-04-24 RX ADMIN — Medication 2000 MILLIGRAM(S): at 13:27

## 2023-04-24 NOTE — ED PROVIDER NOTE - PROGRESS NOTE DETAILS
Patient is resting comfortably, NAD. Still on doxycycline from prior ED visit. Return to the ED immediately if getting worse, not improving, or if having any new or troubling symptoms.

## 2023-04-24 NOTE — ED PROVIDER NOTE - OBJECTIVE STATEMENT
Patient reports that he has been having 1 week of yellow penile discharge.  Went to another ED where he was treated for gonorrhea and chlamydia.  Told that his test results were negative.  Discharge began to improve for a few days but then got worse again 3 days ago and he developed a sore throat.  No fever, chest pain, shortness of breath, abdominal pain, testicular pain or swelling, rash

## 2023-04-24 NOTE — ED ADULT NURSE NOTE - NSICDXPASTMEDICALHX_GEN_ALL_CORE_FT
Medicare Part B Preventive Services Limitations Recommendation Scheduled Bone Mass Measurement 
(age 72 & older, biennial) Requires diagnosis related to osteoporosis or estrogen deficiency. Biennial benefit unless patient has history of long-term glucocorticoid tx or baseline is needed because initial test was by other method Cardiovascular Screening Blood Tests (every 5 years) Total cholesterol, HDL, Triglycerides Order as a panel if possible Colorectal Cancer Screening 
-Fecal occult blood test (annual) -Flexible sigmoidoscopy (5y) 
-Screening colonoscopy (10y) -Barium Enema Counseling to Prevent Tobacco Use (up to 8 sessions per year) - Counseling greater than 3 and up to 10 minutes - Counseling greater than 10 minutes Patients must be asymptomatic of tobacco-related conditions to receive as preventive service Diabetes Screening Tests (at least every 3 years, Medicare covers annually or at 6-month intervals for prediabetic patients) Fasting blood sugar (FBS) or glucose tolerance test (GTT) Patient must be diagnosed with one of the following: 
-Hypertension, Dyslipidemia, obesity, previous impaired FBS or GTT 
Or any two of the following: overweight, FH of diabetes, age ? 72, history of gestational diabetes, birth of baby weighing more than 9 pounds Diabetes Self-Management Training (DSMT) (no USPSTF recommendation) Requires referral by treating physician for patient with diabetes or renal disease. 10 hours of initial DSMT session of no less than 30 minutes each in a continuous 12-month period. 2 hours of follow-up DSMT in subsequent years. Glaucoma Screening (no USPSTF recommendation) Diabetes mellitus, family history, , age 48 or over,  American, age 72 or over Human Immunodeficiency Virus (HIV) Screening (annually for increased risk patients) HIV-1 and HIV-2 by EIA, ANGELA, rapid antibody test, or oral mucosa transudate Patient must be at increased risk for HIV infection per USPSTF guidelines or pregnant. Tests covered annually for patients at increased risk. Pregnant patients may receive up to 3 test during pregnancy. Medical Nutrition Therapy (MNT) (for diabetes or renal disease not recommended schedule) Requires referral by treating physician for patient with diabetes or renal disease. Can be provided in same year as diabetes self-management training (DSMT), and CMS recommends medical nutrition therapy take place after DSMT. Up to 3 hours for initial year and 2 hours in subsequent years. Prostate Cancer Screening (annually up to age 76) - Digital rectal exam (KIA) - Prostate specific antigen (PSA) Annually (age 48 or over), KIA not paid separately when covered E/M service is provided on same date Seasonal Influenza Vaccination (annually) Pneumococcal Vaccination (once after 65) Hepatitis B Vaccinations (if medium/high risk) Medium/high risk factors:  End-stage renal disease, Hemophiliacs who received Factor VIII or IX concentrates, Clients of institutions for the mentally retarded, Persons who live in the same house as a HepB virus carrier, Homosexual men, Illicit injectable drug abusers. Screening Mammography (biennial age 54-69)? Annually (age 36 or over) Screening Pap Tests and Pelvic Examination (up to age 79 and after 79 if unknown history or abnormal study last 10 years) Every 24 months except high risk Ultrasound Screening for Abdominal Aortic Aneurysm (AAA) (once) Patient must be referred through IPPE and not have had a screening for abdominal aortic aneurysm before under Medicare. Limited to patients who meet one of the following criteria: 
- Men who are 73-68 years old and have smoked more than 100 cigarettes in their lifetime. 
-Anyone with a FH of AAA 
-Anyone recommended for screening by USPSTF Family Practice Management 2011 Health Maintenance Due  
 Topic Date Due  
 Annual Well Visit  01/19/2019 PAST MEDICAL HISTORY:  No pertinent past medical history

## 2023-04-24 NOTE — ED PROVIDER NOTE - PHYSICAL EXAMINATION
Gen: Well-developed, well-nourished, NAD, HEENT: NCAT, mmm, moderate pharyngeal erythema. no tonsillar enlargement or exudates. Normal voice, no trismus  Chest: RRR, nl S1 and S2, no m/r/g. Resp: CTAB, no w/r/r  Abd: nl BS, soft, nt/nd. Ext: Warm, dry

## 2023-04-24 NOTE — ED PROVIDER NOTE - PATIENT PORTAL LINK FT
You can access the FollowMyHealth Patient Portal offered by [facility] by registering at the following website: [email or web address]. By joining [employer]’s FollowMyHealth portal, you will also be able to view your health information using other applications (apps) compatible with our system.

## 2023-04-24 NOTE — ED PROVIDER NOTE - NSFOLLOWUPINSTRUCTIONS_ED_ALL_ED_FT
Sexually Transmitted Diseases    AMBULATORY CARE:    A sexually transmitted disease (STD) means signs or symptoms of a sexually transmitted infection (STI) have developed. An STI happens when bacteria or a virus are spread through oral, genital, or anal sex. Some examples of STDs are HIV, chlamydia, syphilis, and gonorrhea.    Common signs and symptoms may include one or more of the following, depending on the STD:    Blisters, warts, sores, or a rash on your skin that may be painful    Discharge from the penis, vagina, or anus that may have a foul smell    Fever, muscle pain, or swollen lymph nodes in the groin    Inflammation and itching    Pelvic or abdominal pain, or pain during sex or when urinating    Sore throat, mouth ulcers, or trouble swallowing    Vaginal bleeding or spotting after sex (women)  Seek care immediately if:    You have genital swelling or pain, or unusual bleeding.    You have joint pain, a rash, swollen lymph nodes, or night sweats.    You have severe abdominal pain.  Call your doctor if:    You have a fever.    Your symptoms do not go away or get worse, even after treatment.    You have bleeding or pain during sex.    You or your female partner may be pregnant.    You have questions or concerns about your condition or care.  Treatment for an STD depends on the STD you have. Antibiotics may be given for a bacterial infection. Antivirals may be given for a viral infection. Antifungals may be given for a fungal infection, such as a yeast infection. Early treatment may decrease the risk for certain cancers. Early treatment can also help prevent infertility.    Help prevent the spread of an STI: Ask your healthcare provider for more information about the following safe sex practices:    Use a male or female condom during sex. This includes oral, genital, or anal sex. Use a new condom each time. Condoms help prevent pregnancy and STIs. Use latex condoms, if possible. Lambskin (also called sheepskin or natural membrane) condoms do not protect against STIs. A polyurethane condom can be used if you or your partner is allergic to latex. Condoms should be used with a second form of birth control to help prevent pregnancy and STIs. Do not use male and female condoms together.    Do not have sex with someone who has an STI or STD. This includes oral and anal sex.    Limit sex partners. Ask about your partner's sex history before you have sex.    Get screened regularly if you are sexually active. Common tests include chlamydia, gonorrhea, HIV, hepatitis, and syphilis.    Tell your sex partners if you have an STI. Your partners may need to be tested and treated. Do not have sex while you are being treated for an STI. Do not have sex with a partner who is being treated.    Ask about medicines to lower your risk for some STIs:  Vaccines can help protect you from hepatitis A, hepatitis B, and the human papillomavirus (HPV). The HPV vaccine is usually given at 11 years, but it may be given through 26 years to both females and males. Your provider can give you more information on vaccines to prevent STIs.    Pre-exposure prophylaxis (PrEP) may be given if you are at high risk for HIV. PrEP is taken every day to prevent the virus from fully infecting the body.    If you are a woman, do not douche. Douching upsets the normal balance of bacteria found in your vagina. It does not prevent or clear up vaginal infections.  Follow up with your doctor as directed: You may need more tests. If you have an STD, you may need immediate or ongoing treatment. Your doctor may also refer you to a specialist who can provide specific treatment. Write down your questions so you remember to ask them during your visits.    © [employer] 1973, 2023    	  back to top            © [employer] 1973, 2023

## 2023-04-24 NOTE — ED ADULT TRIAGE NOTE - CHIEF COMPLAINT QUOTE
pt with recurrent visits for pharyngitis most recently 2 days ago returns for same. On abx course doxy, has two more days of tx for STI.

## 2023-04-24 NOTE — ED PROVIDER NOTE - CHIEF COMPLAINT
44y/o  who has had heavy bleeding since 2021 intermittently with spotting in between for 14 days, but there has been bleeding almost every day.   LMP: 2021 till now   On Orthocept TID till yesterday started on Friday, 2021. Is feeling mildly nauseous since yesterday. Had mild Diarrhea this morning also.   But bleeding almost stopped since Saturday.   I advised her that she may decrease the dose of Ortho-Cept to twice a day.   All previous notes were discussed.   Patient has had heavy bleeding starting  and it has been ongoing till today.   It was controlled somewhat with norethindrone acetate twice a day but then bleeding ensued again. It was very heavy on 21, when we suspect that her Mirena IUD was expelled.   Patient has known history of fibroid and menorrhagia that was controlled by Mirena IUD, but this time it got expelled but was not seen by patient so x-ray abdomen was done and no IUD was seen on that x-ray.   Later due to ongoing bleeding she was started on Ortho-Cept 3 times a day by Dr. Pascal on 21 and that has controlled the bleeding.   Patient had paged me on Friday evening stating that the bleeding was heavy again when I had advised her to take it 3 times a day and continue with ibuprofen 600 mg every 6 hours after meals.   By Saturday she had stopped bleeding and she only spots or has brownish discharge occasionally.   Since she continues to bleed, patient has decided for hysterectomy now. Other options of Lupron treatment and UAE were considered in the past but declined now.   Patient discussed supracervical hysterectomy, as online, there is a lot of talk about sexual health that is better with retaining Cx.     Pt underwent uncomplicated RONNA and BLS.  Uncomplicated postop course.  DC home on POD 2 in stable condition  Hb stable  S/p Fe infusion x 1  FU in 2 wks    
The patient is a 27y Male complaining of Penile discharge, sore throat.

## 2023-04-25 DIAGNOSIS — H10.9 UNSPECIFIED CONJUNCTIVITIS: ICD-10-CM

## 2023-04-25 DIAGNOSIS — H57.9 UNSPECIFIED DISORDER OF EYE AND ADNEXA: ICD-10-CM

## 2023-04-25 LAB
C TRACH RRNA SPEC QL NAA+PROBE: SIGNIFICANT CHANGE UP
N GONORRHOEA RRNA SPEC QL NAA+PROBE: SIGNIFICANT CHANGE UP
SPECIMEN SOURCE: SIGNIFICANT CHANGE UP
SPECIMEN SOURCE: SIGNIFICANT CHANGE UP
T PALLIDUM AB TITR SER: NEGATIVE — SIGNIFICANT CHANGE UP
T VAGINALIS RRNA SPEC QL NAA+PROBE: SIGNIFICANT CHANGE UP

## 2023-04-26 DIAGNOSIS — N34.2 OTHER URETHRITIS: ICD-10-CM

## 2023-04-26 DIAGNOSIS — J02.9 ACUTE PHARYNGITIS, UNSPECIFIED: ICD-10-CM

## 2023-05-05 NOTE — ED PROVIDER NOTE - INTERNATIONAL TRAVEL
n/a No Calcipotriene Pregnancy And Lactation Text: This medication has not been proven safe during pregnancy. It is unknown if this medication is excreted in breast milk.

## 2023-05-10 ENCOUNTER — EMERGENCY (EMERGENCY)
Facility: HOSPITAL | Age: 28
LOS: 1 days | Discharge: HOME | End: 2023-05-10
Attending: EMERGENCY MEDICINE | Admitting: EMERGENCY MEDICINE
Payer: MEDICAID

## 2023-05-10 VITALS
HEART RATE: 88 BPM | WEIGHT: 154.98 LBS | HEIGHT: 69 IN | OXYGEN SATURATION: 95 % | SYSTOLIC BLOOD PRESSURE: 127 MMHG | DIASTOLIC BLOOD PRESSURE: 77 MMHG | TEMPERATURE: 98 F | RESPIRATION RATE: 16 BRPM

## 2023-05-10 VITALS
DIASTOLIC BLOOD PRESSURE: 90 MMHG | HEART RATE: 79 BPM | SYSTOLIC BLOOD PRESSURE: 132 MMHG | RESPIRATION RATE: 18 BRPM | OXYGEN SATURATION: 97 % | TEMPERATURE: 99 F

## 2023-05-10 DIAGNOSIS — K59.00 CONSTIPATION, UNSPECIFIED: ICD-10-CM

## 2023-05-10 DIAGNOSIS — K64.9 UNSPECIFIED HEMORRHOIDS: ICD-10-CM

## 2023-05-10 PROCEDURE — 99283 EMERGENCY DEPT VISIT LOW MDM: CPT

## 2023-05-10 RX ORDER — SENNOSIDES/DOCUSATE SODIUM 8.6MG-50MG
2 TABLET ORAL
Qty: 20 | Refills: 0
Start: 2023-05-10 | End: 2023-05-14

## 2023-05-10 RX ORDER — HYDROCORTISONE 1 %
1 OINTMENT (GRAM) TOPICAL
Qty: 42 | Refills: 0
Start: 2023-05-10 | End: 2023-05-23

## 2023-05-10 NOTE — ED PROVIDER NOTE - PATIENT PORTAL LINK FT
You can access the FollowMyHealth Patient Portal offered by [facility] by registering at the following website: [email or web address]. By joining [employer]’s FollowMyHealth portal, you will also be able to view your health information using other applications (apps) compatible with our system.

## 2023-05-10 NOTE — ED ADULT NURSE NOTE - NSFALLUNIVINTERV_ED_ALL_ED
Bed/Stretcher in lowest position, wheels locked, appropriate side rails in place/Call bell, personal items and telephone in reach/Instruct patient to call for assistance before getting out of bed/chair/stretcher/Non-slip footwear applied when patient is off stretcher/[place] to call system/Physically safe environment - no spills, clutter or unnecessary equipment/Purposeful proactive rounding/Room/bathroom lighting operational, light cord in reach

## 2023-05-10 NOTE — ED PROVIDER NOTE - PHYSICAL EXAMINATION
PE: PE: A&Ox3, well appearing, NAD, NCAT, regular respiratory effort, moving all four extremities equally. Abd soft ntnd. Rectal exam with sign of small, pea-sized hemorrhoid, non-thrombosed.

## 2023-05-10 NOTE — ED PROVIDER NOTE - NSFOLLOWUPINSTRUCTIONS_ED_ALL_ED_FT
Please read all handouts provided to you from the emergency department. Seek immediate medical attention for any new/worsening signs or symptoms.  Take any prescribed medications as directed. Please follow up with  your primary physician in the next 3-5 days.     If you would like to see a Gastroenterologist, please call one of the offices listed below:    [facility]  [address] [place] [address]  [phone]    Medicine Specialties at [address], [address] [place] [id number]  [phone]     Hemorrhoids    WHAT YOU NEED TO KNOW:  Hemorrhoids are swollen blood vessels inside your rectum (internal hemorrhoids) or on your anus (external hemorrhoids). Sometimes a hemorrhoid may prolapse. This means it extends out of your anus.    DISCHARGE INSTRUCTIONS:  Return to the emergency department if:   •You have severe pain in your rectum or around your anus.  •You have severe pain in your abdomen and you are vomiting.   •You have bleeding from your anus that soaks through your underwear.     Contact your healthcare provider if:   •You have frequent and painful bowel movements.  •Your hemorrhoid looks or feels more swollen than usual.   •You do not have a bowel movement for 2 days or more.   •You see or feel tissue coming through your anus.   •You have questions or concerns about your condition or care.    Medicines: You may need any of the following:   •A pad, cream, or ointment can help decrease pain, swelling, and itching.   •Stool softeners help treat or prevent constipation.   •NSAIDs, such as ibuprofen, help decrease swelling, pain, and fever. NSAIDs can cause stomach bleeding or kidney problems in certain people. If you take blood thinner medicine, always ask your healthcare provider if NSAIDs are safe for you. Always read the medicine label and follow directions.  •Take your medicine as directed. Contact your healthcare provider if you think your medicine is not helping or if you have side effects. Tell him or her if you are allergic to any medicine. Keep a list of the medicines, vitamins, and herbs you take. Include the amounts, and when and why you take them. Bring the list or the pill bottles to follow-up visits. Carry your medicine list with you in case of an emergency.    Manage your symptoms:   •Apply ice on your anus for 15 to 20 minutes every hour or as directed. Use an ice pack, or put crushed ice in a plastic bag. Cover it with a towel before you apply it to your anus. Ice helps prevent tissue damage and decreases swelling and pain.  •Take a sitz bath. Fill a bathtub with 4 to 6 inches of warm water. You may also use a sitz bath pan that fits inside a toilet bowl. Sit in the sitz bath for 15 minutes. Do this 3 times a day, and after each bowel movement. The warm water can help decrease pain and swelling.   •Keep your anal area clean. Gently wash the area with warm water daily. Soap may irritate the area. After a bowel movement, wipe with moist towelettes or wet toilet paper. Dry toilet paper can irritate the area.     Prevent hemorrhoids:   •Do not strain to have a bowel movement. Do not sit on the toilet too long. These actions can increase pressure on the tissues in your rectum and anus.   •Drink plenty of liquids. Liquids can help prevent constipation. Ask how much liquid to drink each day and which liquids are best for you.   •Eat a variety of high-fiber foods. Examples include fruits, vegetables, and whole grains. Ask your healthcare provider how much fiber you need each day. You may need to take a fiber supplement.   •Exercise as directed. Exercise, such as walking, may make it easier to have a bowel movement. Ask your healthcare provider to help you create an exercise plan.   •Do not have anal sex. Anal sex can weaken the skin around your rectum and anus.   •Avoid heavy lifting. This can cause straining and increase your risk for another hemorrhoid.     Follow up with your doctor as directed: Write down your questions so you remember to ask them during your visits.

## 2023-05-10 NOTE — ED ADULT NURSE NOTE - BEFAST LAST WELL KNOWN
Unknown High Dose Vitamin A Pregnancy And Lactation Text: High dose vitamin A therapy is contraindicated during pregnancy and breast feeding.

## 2023-05-10 NOTE — ED PROVIDER NOTE - OBJECTIVE STATEMENT
27M history of constipation and hemorrhoids presents for evaluation of these chronic conditions. Patient passing hard stool and straining to do so. Denies blood in stool, nausea/vomiting, fevers/chills, lightheadedness/dizziness. Abdominal discomfort but denies overt pain.

## 2023-05-10 NOTE — ED ADULT TRIAGE NOTE - CHIEF COMPLAINT QUOTE
"I have diarrhea and constipation" x 1 day, also c/o pain with diarrhea d/t external hemorrhoids, also c/o rash to left ankle

## 2023-05-10 NOTE — ED PROVIDER NOTE - PRINCIPAL DIAGNOSIS
Assessment/Plan:    Problem List Items Addressed This Visit        Genitourinary    Malignant neoplasm of endocervix [facility]) - Primary     Patient has now progressive disease after 3 cycles of treatment with single agent gemcitabine  Unfortunately, her disease is incurable, progressive and multidrug-resistant  Today we discussed potential treatment options including referral for clinical trials (not interested), treatment with other palliative agents (discussed specific logistics and toxicities associated with Yaya Dixon and other cytotoxic agents and patient is not interested in exploring any of those)  She understands potential benefit from tumor directed therapies is quite modest at this point  She also understands potential risk of toxicities which include worsen her overall quality of life  Alternatively, we discussed the possibility of change goal of care to comfort  She is interested in maximizing her quality of life  Patient is not interested in further tumor directed therapies  With this in mind we will refer her to palliative care and to discuss best supportive care and eventual transition to hospice if/as appropriate  Patient has made clear her desire to be DNR/DNI  Questions answered to patient's satisfaction  I will remain available if patient changes her mind or if she has any other questions regarding her disease state, prognosis or management options  Other    DNR (do not resuscitate) discussion     Is aware of her prognosis and she has made clear her desire to be DO NOT RESUSCITATE/DO NOT INTUBATE status  She will be referred to palliative care to further discuss goals of care, complete POLST form, etc           I spent approximately 20 minutes in the care of this patient    More than 50% time was related to face-to-face counseling, review of images, review of potential treatment options, discussion of pros/cons and logistics of potential tumor directed therapies, discussion of end-of-life wishes and goals  All questions were answered to patient satisfaction  CHIEF COMPLAINT:   Follow-up after 3 cycles of gemcitabine for recurrent, multidrug-resistant incurable metastatic cervical cancer  Problem:  Cancer Staging   Malignant neoplasm of endocervix [facility])  Staging form: Cervix Uteri, AJCC 7th Edition  - Clinical stage from 3/29/2017: FIGO Stage IB2, calculated as Stage Unknown (T1b2, NX, M0) - Signed by Maud Carrel, MD on 2/4/2021      Previous therapy:  Oncology History   Malignant neoplasm of endocervix ([address] )   12/2016 Initial Diagnosis    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 ([facility])  3/29/2017 -  Cancer Staged    Staging form: Cervix Uteri, AJCC 7th Edition  - Clinical stage from 3/29/2017: FIGO Stage IB2, calculated as Stage Unknown (T1b2, NX, M0) - Signed by Maud Carrel, MD on 2/4/2021  Staged by: Managing physician  Specimen type: Excision  Histopathologic type: Squamous cell carcinoma, NOS  Histologic grade (G): G3  Lymph-vascular invasion (LVI): LVI present/identified, NOS  Residual tumor (R): R0 - None  Pelvic jace status: Negative  Pelvic jace method of assessment: PET  Para-aortic status: Negative  Para-aortic jace method of assessment: PET  Stage used in treatment planning: Yes  National guidelines used in treatment planning: Yes  Type of national guideline used in treatment planning: NCCN       3/29/2017 Surgery    RATLH/BSO for suspected CIN3 (no excisional procedure)  FInal path with incidental 5 cm grade 3 invasive cervical SCCA, +LVI, invasion 9/10 mm cervical stroma, no lymphadenectomy, margins negative ([facility])  2/19/2021 Progression    A  Lymph Node, Left axillary, Biopsy:  - Metastatic squamous cell carcinoma (p16 positive) involving lymph node, compatible with patient's cervical primary         3/2/2021 -  Chemotherapy    Carboplatin AUC 5, Taxol 135 milligram/m2 and Avastin at 15 milligram/kilogram all to be administer on day 1 of a 21 day cycle     7/7/2021 - 2/2/2022 Chemotherapy    bevacizumab (AVASTIN) IVPB, 910 mg, Intravenous, Once, 11 of 16 cycles  Administration: 900 mg (7/7/2021), 900 mg (7/30/2021), 900 mg (8/18/2021), 900 mg (9/8/2021), 900 mg (9/29/2021), 900 mg (10/20/2021), 900 mg (11/10/2021), 900 mg (12/1/2021), 900 mg (12/22/2021), 900 mg (1/12/2022), 900 mg (2/2/2022)     2/22/2022 - 8/31/2022 Chemotherapy    pembrolizumab (KEYTRUDA) IVPB, 200 mg, Intravenous, Once, 10 of 12 cycles  Administration: 200 mg (2/22/2022), 200 mg (4/6/2022), 200 mg (4/27/2022), 200 mg (5/18/2022), 200 mg (6/8/2022), 200 mg (3/16/2022), 200 mg (6/29/2022), 200 mg (7/20/2022), 200 mg (8/10/2022), 200 mg (8/31/2022)     2/23/2022 - 2/23/2022 Chemotherapy    pembrolizumab (KEYTRUDA) IVPB, 200 mg, Intravenous, Once, 0 of 6 cycles     9/14/2022 Progression    Progression of metastatic disease noted on CT     9/21/2022 - 11/23/2022 Chemotherapy    Pegfilgrastim-bmez (ZIEXTENZO), 6 mg, Subcutaneous, Once, 1 of 2 cycles  Administration: 6 mg (10/6/2022)  topotecan (HYCAMTIN) chemo infusion, 3 mg/m2 = 4 8 mg, Intravenous, Once, 3 of 4 cycles  Dose modification: 2 5 mg/m2 (original dose 3 mg/m2, Cycle 4, Reason: Dose modified as per discussion with consulting physician)  Administration: 4 8 mg (9/21/2022), 4 8 mg (9/28/2022), 4 8 mg (10/19/2022), 4 7 mg (10/26/2022), 4 7 mg (11/16/2022), 4 7 mg (11/23/2022)     12/28/2022 -  Chemotherapy    Gemcitabine 750 mg/m2 days 1 and 8 every 21 days  Dose-reduced to 650 mg/m2 with cycle 2  She is scheduled for cycle 3  Patient ID: Yolanda Robbins is a 79 y o  female  HPI  Patient presents for follow-up after 3 cycles of single agent gemcitabine  She has recurrent, metastatic multidrug-resistant stage I B2 cervical cancer  Unfortunately, CT scan demonstrates evidence of disease progression as evidenced by new metastatic disease to adrenal gland in excess of 3 cm    Patient continues to have right flank pain which she continues to attribute to her stent  Denies vaginal bleeding, drainage or discharge  Upper respiratory infection symptoms have been persistent since cold a few weeks ago  Normal bowel bladder function  Decreased appetite  No nausea vomiting  No alopecia  Review of Systems  As per HPI  Review of systems otherwise noncontributory  Current Outpatient Medications   Medication Sig Dispense Refill   • albuterol (PROVENTIL HFA,VENTOLIN HFA) 90 mcg/act inhaler INHALE 2 PUFFS EVERY 4 HOURS AS NEEDED FOR SHORTNESS OF BREATH  18 g 1   • benzonatate (TESSALON PERLES) 100 mg capsule Take 1-2 capsules by mouth three times a day as needed for cough 40 capsule 1   • buPROPion (WELLBUTRIN XL) 300 mg 24 hr tablet TAKE 1 TABLET BY MOUTH EVERY DAY (Patient taking differently: every morning) 90 tablet 2   • Diclofenac Sodium (VOLTAREN) 1 % Apply 2 g topically 4 (four) times a day 350 g 3   • gabapentin (NEURONTIN) 100 mg capsule TAKE 2 CAPSULES (200 MG TOTAL) BY MOUTH DAILY AND 2 CAPSULES (200 MG TOTAL) DAILY AT BEDTIME  TO REDUCE NERVE PAIN  120 capsule 5   • lidocaine-prilocaine (EMLA) cream Apply to PORT site 30 min prior to labs or chemo 30 g 2   • LORazepam (ATIVAN) 1 mg tablet Take 1 tablet (1 mg total) by mouth every 8 (eight) hours as needed for anxiety 20 tablet 0   • omeprazole (PriLOSEC) 40 MG capsule Take 1 capsule (40 mg total) by mouth every morning 90 capsule 0   • ondansetron (ZOFRAN) 8 mg tablet Take 1 tablet (8 mg total) by mouth every 8 (eight) hours as needed for nausea or vomiting 60 tablet 1   • polyethylene glycol (MIRALAX) 17 g packet Take 17 g by mouth daily 30 each 5   • promethazine (PHENERGAN) 25 mg tablet Take 1 tablet (25 mg total) by mouth every 6 (six) hours as needed for nausea or vomiting 30 tablet 1   • senna (SENOKOT) 8 6 MG tablet Take 1 tablet (8 6 mg total) by mouth 2 (two) times a day Take to prevent constipation from pain meds    Hold one dose for loose stool (Patient taking differently: Take 1 tablet by mouth if needed Take to prevent constipation from pain meds  Hold one dose for loose stool) 60 tablet 11   • traZODone (DESYREL) 50 mg tablet TAKE 1-2 AT BEDTIME 180 tablet 2   • diphenhydrAMINE (BENADRYL) 25 mg capsule Take 50 mg by mouth if needed (Patient not taking: Reported on 2/28/2023)     • Multiple Vitamins-Minerals (Alive Womens Gummy) [facility] (Patient not taking: Reported on 2/28/2023)       No current facility-administered medications for this visit       Facility-Administered Medications Ordered in Other Visits   Medication Dose Route Frequency Provider Last Rate Last Admin   • alteplase (CATHFLO) injection 2 mg  2 mg Intracatheter Q1MIN PRN Morelia Frazier MD           Allergies   Allergen Reactions   • Medical Tape Itching     And redness from adhesive    And "skin raised up" especially longer on skin   • Aspirin GI Intolerance   • Codeine GI Intolerance   • Dust Mite Extract    • Pollen Extract Sneezing       Past Medical History:   Diagnosis Date   • Acne    • Anxiety    • Anxiety and depression 10/02/2019   • Arthritis    • Breathing difficulty     pt does not recall any breathing problem but did have neck pain after procedure" Gave her an incentive spirometer to use   • Cancer (HCC)     cervical/ and "lymph nodes" (GYN)   • Cervical cancer (HCC)    • Chronic kidney disease     stage 3   • Chronic pain disorder     Neck pain   • Claustrophobia    • Colon polyp    • GERD (gastroesophageal reflux disease)     not now   • Headache    • History of radiation therapy    • History of shingles    • Irritable bowel syndrome    • Joint pain following chemotherapy     and stiffness   • Liver disease     "cyst on liver"   • Lung nodule     "left"   • Motion sickness    • Muscle weakness     after chemo"   • Neck pain     "T2 and T3 are  fused and radiates"has had neck pain since childhood"   • Peptic ulceration 2016   • Port-A-Cath in place     right chest//for chemotherapy q 3 weeks and also receives IV hydration   • Shortness of breath     "started with cancer, when too much exertion like cleaning/guera doing stairs"-pt states it is much better   • Thumb injury 10/2019    right   • Thyroid nodule    • Tinnitus of left ear 02/19/2020   • Urinary problem    • Wears contact lenses    • Wrist arthritis 03/17/2020       Past Surgical History:   Procedure Laterality Date   • COLONOSCOPY     • CYSTOSCOPY W/ RETROGRADES Right 06/03/2021    Procedure: Marquita Harvey W/RETROGRADE;  Surgeon: Deanna Moses MD;  Location: [facility];  Service: Urology   • CYSTOSCOPY W/ RETROGRADES Right 10/18/2021    Procedure: CYSTOSCOPY WITH ureteral stent exchange;  Surgeon: Rebecca Schilling MD;  Location: [facility];  Service: Urology   • FL RETROGRADE PYELOGRAM  02/11/2021   • FL RETROGRADE PYELOGRAM  06/03/2021   • FOOT SURGERY     • HYSTERECTOMY     • IR BIOPSY LYMPH NODE  02/19/2021   • IR PORT PLACEMENT  03/09/2021   • LYMPH NODE BIOPSY  2021   • TN CYSTO BLADDER W/URETERAL CATHETERIZATION Right 02/11/2021    Procedure: CYSTOSCOPY RETROGRADE PYELOGRAM WITH INSERTION STENT URETERAL;  Surgeon: Rebecca Schilling MD;  Location:  MAIN OR;  Service: Urology   • TN CYSTO BLADDER W/URETERAL CATHETERIZATION Right 04/04/2022    Procedure: CYSTOSCOPY  WITH INSERTION STENT URETERAL Right;  Surgeon: Rebecca Schilling MD;  Location: [facility];  Service: Urology   • TN CYSTO BLADDER W/URETERAL CATHETERIZATION Right 10/27/2022    Procedure: Cystoscopy, right ureteral stent exchange;  Surgeon: Rebecca Schilling MD;  Location: [facility];  Service: Urology   • TN EXCISION INTERDIGITAL KESSLER NEUROMA SINGLE EACH Left 07/12/2018    Procedure: FOOT NEUROMA EXCISION;  Surgeon: Reanna Palma DPM;  Location: [facility] MAIN OR;  Service: Podiatry   • [address] Right 06/03/2021    Procedure: EXCHANGE STENT;  Surgeon: Deanna Moses MD;  Location: [facility];  Service: Urology   • TUBAL LIGATION     • UPPER GASTROINTESTINAL ENDOSCOPY         OB History        6    Para   4    Term   4            AB   2    Living   4       SAB   2    IAB        Ectopic        Multiple        Live Births   4                 Family History   Problem Relation Age of Onset   • Stomach cancer Mother    • Cancer Mother         Stomach Cancer   • Diabetes Mother    • Alcohol abuse Father    • No Known Problems Sister    • No Known Problems Daughter    • No Known Problems Maternal Grandmother    • No Known Problems Maternal Grandfather    • No Known Problems Paternal Grandmother    • No Known Problems Paternal Grandfather    • No Known Problems Sister    • No Known Problems Sister    • No Known Problems Sister    • No Known Problems Sister    • No Known Problems Daughter    • No Known Problems Daughter    • No Known Problems Maternal Aunt    • No Known Problems Maternal Aunt    • No Known Problems Paternal Aunt    • No Known Problems Paternal Aunt    • No Known Problems Paternal Aunt    • No Known Problems Paternal Aunt    • No Known Problems Paternal Aunt    • No Known Problems Paternal Aunt    • No Known Problems Paternal Aunt    • No Known Problems Paternal Aunt    • No Known Problems Paternal Aunt    • No Known Problems Paternal Aunt        The following portions of the patient's history were reviewed and updated as appropriate: allergies, current medications, past family history, past medical history, past social history, past surgical history and problem list       Objective:    Blood pressure 118/60, pulse (!) 122, height 4' 11 02" (1 499 m), weight 59 4 kg (131 lb), SpO2 99 %, not currently breastfeeding  Body mass index is 26 44 kg/m²  Physical Exam  Physical exam deferred      No results found for:   Lab Results   Component Value Date    WBC 8 43 2023    HGB 8 8 (L) 2023    HCT 28 7 (L) 2023    MCV 96 2023     2023     Lab Results Component Value Date     06/11/2018    K 4 3 03/20/2023     03/20/2023    CO2 24 03/20/2023    ANIONGAP 13 0 06/11/2018    BUN 16 03/20/2023    CREATININE 0 86 03/20/2023    GLUF 126 (H) 03/06/2023    CALCIUM 9 3 03/20/2023    CORRECTEDCA 10 0 01/16/2023    AST 22 03/20/2023    ALT 57 (H) 03/20/2023    ALKPHOS 219 (H) 03/20/2023    PROT 7 0 06/11/2018    BILITOT 0 3 06/11/2018    EGFR 70 03/20/2023       Mansoor Alvarez MD, [place], FACS  3/21/2023  9:57 AM Hemorrhoid

## 2023-06-27 ENCOUNTER — TRANSCRIPTION ENCOUNTER (OUTPATIENT)
Age: 28
End: 2023-06-27

## 2023-06-28 ENCOUNTER — EMERGENCY (EMERGENCY)
Facility: HOSPITAL | Age: 28
LOS: 1 days | Discharge: HOME | End: 2023-06-28
Attending: EMERGENCY MEDICINE | Admitting: EMERGENCY MEDICINE
Payer: MEDICAID

## 2023-06-28 VITALS
WEIGHT: 154.98 LBS | OXYGEN SATURATION: 95 % | SYSTOLIC BLOOD PRESSURE: 109 MMHG | RESPIRATION RATE: 18 BRPM | HEIGHT: 69 IN | DIASTOLIC BLOOD PRESSURE: 71 MMHG | HEART RATE: 120 BPM | TEMPERATURE: 100 F

## 2023-06-28 VITALS — HEART RATE: 112 BPM

## 2023-06-28 LAB
ALBUMIN SERPL ELPH-MCNC: 4.5 G/DL — SIGNIFICANT CHANGE UP (ref 3.4–5)
ALP SERPL-CCNC: 72 U/L — SIGNIFICANT CHANGE UP (ref 40–120)
ALT FLD-CCNC: 35 U/L — SIGNIFICANT CHANGE UP (ref 12–42)
AMPHET UR-MCNC: NEGATIVE — SIGNIFICANT CHANGE UP
ANION GAP SERPL CALC-SCNC: 12 MMOL/L — SIGNIFICANT CHANGE UP (ref 9–16)
APPEARANCE UR: CLEAR — SIGNIFICANT CHANGE UP
AST SERPL-CCNC: 21 U/L — SIGNIFICANT CHANGE UP (ref 15–37)
BARBITURATES UR SCN-MCNC: NEGATIVE — SIGNIFICANT CHANGE UP
BASOPHILS # BLD AUTO: 0.04 K/UL — SIGNIFICANT CHANGE UP (ref 0–0.2)
BASOPHILS NFR BLD AUTO: 0.5 % — SIGNIFICANT CHANGE UP (ref 0–2)
BENZODIAZ UR-MCNC: NEGATIVE — SIGNIFICANT CHANGE UP
BILIRUB SERPL-MCNC: 0.6 MG/DL — SIGNIFICANT CHANGE UP (ref 0.2–1.2)
BILIRUB UR-MCNC: NEGATIVE — SIGNIFICANT CHANGE UP
BUN SERPL-MCNC: 8 MG/DL — SIGNIFICANT CHANGE UP (ref 7–23)
CALCIUM SERPL-MCNC: 9.1 MG/DL — SIGNIFICANT CHANGE UP (ref 8.5–10.5)
CHLORIDE SERPL-SCNC: 104 MMOL/L — SIGNIFICANT CHANGE UP (ref 96–108)
CK MB BLD-MCNC: 0.35 % — SIGNIFICANT CHANGE UP
CK MB CFR SERPL CALC: 0.5 NG/ML — SIGNIFICANT CHANGE UP (ref 0.5–3.6)
CK SERPL-CCNC: 142 U/L — SIGNIFICANT CHANGE UP (ref 39–308)
CO2 SERPL-SCNC: 25 MMOL/L — SIGNIFICANT CHANGE UP (ref 22–31)
COCAINE METAB.OTHER UR-MCNC: POSITIVE
COLOR SPEC: YELLOW — SIGNIFICANT CHANGE UP
CREAT SERPL-MCNC: 0.95 MG/DL — SIGNIFICANT CHANGE UP (ref 0.5–1.3)
DIFF PNL FLD: NEGATIVE — SIGNIFICANT CHANGE UP
EGFR: 113 ML/MIN/1.73M2 — SIGNIFICANT CHANGE UP
EOSINOPHIL # BLD AUTO: 0.03 K/UL — SIGNIFICANT CHANGE UP (ref 0–0.5)
EOSINOPHIL NFR BLD AUTO: 0.4 % — SIGNIFICANT CHANGE UP (ref 0–6)
ETHANOL SERPL-MCNC: 155 MG/DL — HIGH
GLUCOSE SERPL-MCNC: 119 MG/DL — HIGH (ref 70–99)
GLUCOSE UR QL: NEGATIVE MG/DL — SIGNIFICANT CHANGE UP
HCT VFR BLD CALC: 45.7 % — SIGNIFICANT CHANGE UP (ref 39–50)
HGB BLD-MCNC: 15.7 G/DL — SIGNIFICANT CHANGE UP (ref 13–17)
HIV 1 & 2 AB SERPL IA.RAPID: SIGNIFICANT CHANGE UP
IMM GRANULOCYTES NFR BLD AUTO: 0.4 % — SIGNIFICANT CHANGE UP (ref 0–0.9)
KETONES UR-MCNC: NEGATIVE MG/DL — SIGNIFICANT CHANGE UP
LACTATE SERPL-SCNC: 1.2 MMOL/L — SIGNIFICANT CHANGE UP (ref 0.4–2)
LEUKOCYTE ESTERASE UR-ACNC: NEGATIVE — SIGNIFICANT CHANGE UP
LYMPHOCYTES # BLD AUTO: 1.71 K/UL — SIGNIFICANT CHANGE UP (ref 1–3.3)
LYMPHOCYTES # BLD AUTO: 23.1 % — SIGNIFICANT CHANGE UP (ref 13–44)
MCHC RBC-ENTMCNC: 31.7 PG — SIGNIFICANT CHANGE UP (ref 27–34)
MCHC RBC-ENTMCNC: 34.4 GM/DL — SIGNIFICANT CHANGE UP (ref 32–36)
MCV RBC AUTO: 92.3 FL — SIGNIFICANT CHANGE UP (ref 80–100)
METHADONE UR-MCNC: NEGATIVE — SIGNIFICANT CHANGE UP
MONOCYTES # BLD AUTO: 0.42 K/UL — SIGNIFICANT CHANGE UP (ref 0–0.9)
MONOCYTES NFR BLD AUTO: 5.7 % — SIGNIFICANT CHANGE UP (ref 2–14)
NEUTROPHILS # BLD AUTO: 5.16 K/UL — SIGNIFICANT CHANGE UP (ref 1.8–7.4)
NEUTROPHILS NFR BLD AUTO: 69.9 % — SIGNIFICANT CHANGE UP (ref 43–77)
NITRITE UR-MCNC: NEGATIVE — SIGNIFICANT CHANGE UP
NRBC # BLD: 0 /100 WBCS — SIGNIFICANT CHANGE UP (ref 0–0)
OPIATES UR-MCNC: NEGATIVE — SIGNIFICANT CHANGE UP
PCP SPEC-MCNC: SIGNIFICANT CHANGE UP
PCP UR-MCNC: NEGATIVE — SIGNIFICANT CHANGE UP
PH UR: 6 — SIGNIFICANT CHANGE UP (ref 5–8)
PLATELET # BLD AUTO: 307 K/UL — SIGNIFICANT CHANGE UP (ref 150–400)
POTASSIUM SERPL-MCNC: 3.8 MMOL/L — SIGNIFICANT CHANGE UP (ref 3.5–5.3)
POTASSIUM SERPL-SCNC: 3.8 MMOL/L — SIGNIFICANT CHANGE UP (ref 3.5–5.3)
PROT SERPL-MCNC: 8.2 G/DL — SIGNIFICANT CHANGE UP (ref 6.4–8.2)
PROT UR-MCNC: NEGATIVE MG/DL — SIGNIFICANT CHANGE UP
RBC # BLD: 4.95 M/UL — SIGNIFICANT CHANGE UP (ref 4.2–5.8)
RBC # FLD: 12 % — SIGNIFICANT CHANGE UP (ref 10.3–14.5)
SODIUM SERPL-SCNC: 141 MMOL/L — SIGNIFICANT CHANGE UP (ref 132–145)
SP GR SPEC: 1 — LOW (ref 1–1.03)
THC UR QL: NEGATIVE — SIGNIFICANT CHANGE UP
TROPONIN I, HIGH SENSITIVITY RESULT: <4 NG/L — SIGNIFICANT CHANGE UP
UROBILINOGEN FLD QL: 0.2 MG/DL — SIGNIFICANT CHANGE UP (ref 0.2–1)
WBC # BLD: 7.39 K/UL — SIGNIFICANT CHANGE UP (ref 3.8–10.5)
WBC # FLD AUTO: 7.39 K/UL — SIGNIFICANT CHANGE UP (ref 3.8–10.5)

## 2023-06-28 PROCEDURE — 99285 EMERGENCY DEPT VISIT HI MDM: CPT

## 2023-06-28 PROCEDURE — 73130 X-RAY EXAM OF HAND: CPT | Mod: 26,RT

## 2023-06-28 RX ORDER — ACETAMINOPHEN 500 MG
650 TABLET ORAL ONCE
Refills: 0 | Status: COMPLETED | OUTPATIENT
Start: 2023-06-28 | End: 2023-06-28

## 2023-06-28 RX ORDER — KETOROLAC TROMETHAMINE 30 MG/ML
30 SYRINGE (ML) INJECTION ONCE
Refills: 0 | Status: DISCONTINUED | OUTPATIENT
Start: 2023-06-28 | End: 2023-06-28

## 2023-06-28 RX ORDER — SODIUM CHLORIDE 9 MG/ML
2200 INJECTION INTRAMUSCULAR; INTRAVENOUS; SUBCUTANEOUS ONCE
Refills: 0 | Status: COMPLETED | OUTPATIENT
Start: 2023-06-28 | End: 2023-06-28

## 2023-06-28 RX ORDER — CEFTRIAXONE 500 MG/1
1000 INJECTION, POWDER, FOR SOLUTION INTRAMUSCULAR; INTRAVENOUS ONCE
Refills: 0 | Status: COMPLETED | OUTPATIENT
Start: 2023-06-28 | End: 2023-06-28

## 2023-06-28 RX ADMIN — Medication 650 MILLIGRAM(S): at 17:49

## 2023-06-28 RX ADMIN — Medication 30 MILLIGRAM(S): at 22:30

## 2023-06-28 RX ADMIN — SODIUM CHLORIDE 2200 MILLILITER(S): 9 INJECTION INTRAMUSCULAR; INTRAVENOUS; SUBCUTANEOUS at 17:49

## 2023-06-28 RX ADMIN — CEFTRIAXONE 100 MILLIGRAM(S): 500 INJECTION, POWDER, FOR SOLUTION INTRAMUSCULAR; INTRAVENOUS at 17:49

## 2023-06-28 NOTE — ED PROVIDER NOTE - CARE PROVIDER_API CALL
Alfie Padron  Plastic Surgery  [address], [address]  [place] [address]  Phone: [phone]  Fax: [phone]  Follow Up Time:

## 2023-06-28 NOTE — ED ADULT NURSE NOTE - NSFALLUNIVINTERV_ED_ALL_ED
Bed/Stretcher in lowest position, wheels locked, appropriate side rails in place/Call bell, personal items and telephone in reach/Instruct patient to call for assistance before getting out of bed/chair/stretcher/Non-slip footwear applied when patient is off stretcher/Dane to call system/Physically safe environment - no spills, clutter or unnecessary equipment/Purposeful proactive rounding/Room/bathroom lighting operational, light cord in reach

## 2023-06-28 NOTE — SBIRT NOTE ADULT - NSSBIRTUNABLESCROTHER_GEN_A_CORE
Connected with patient via teledoc, patient refused screening. HC provided patient with [employer] phone for future assistance.

## 2023-06-28 NOTE — ED PROVIDER NOTE - NSFOLLOWUPINSTRUCTIONS_ED_ALL_ED_FT
Quit using alcohol and cocaine.    ? Speak to a SBIRT team member for a complete screening, brief  intervention and referral to treatment  ? Additional services:  o Opioid overdose prevention and Naloxone rescue  education  o Information on medications for addiction treatment  o Enrollment in [employer]    Hours of operations  7 days/week  8:00 am – 8:00 pm  During off hours, leave a voicemail for  next day call back.  Contact SBIRT  [phone]  [email or web address]    Take the oral antibiotic and the topical antifungal as prescribed.  Follow up with the hand surgeon.

## 2023-06-28 NOTE — ED PROVIDER NOTE - PATIENT PORTAL LINK FT
You can access the FollowMyHealth Patient Portal offered by [facility] by registering at the following website: [email or web address] By joining [employer]’s FollowMyHealth portal, you will also be able to view your health information using other applications (apps) compatible with our system.
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
## 2023-06-28 NOTE — ED PROVIDER NOTE - PROGRESS NOTE DETAILS
Raquel.  Patient endorsed to me by Dr. Winslow mild see follow-up hand consultation and repair.  Dr. Padron.  Patient at bedside and requested a repeat x-ray prior to discharge.  Repeat x-ray with previously seen third digit foreign body now gone now with fifth digit still persistent foreign body.  I discussed this with Dr. Padron he will follow him up in the office.  Patient sutured given strict return instructions and will follow up with Dr. Padron with office
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
## 2023-06-28 NOTE — ED PROVIDER NOTE - SKIN COLOR
2 RN skin check complete with Barb RAINEY.   Devices in place O2 via nasal cannula, RLQ drain.  Skin assessed under devices intact.  Confirmed pressure ulcers found on sacrum- to be staged per wound consult.  New potential pressure ulcers noted on n/a. Wound consult placed 3/28 by admitting RN.  The following interventions in place foam ear pads, pillows with q2 t/p, heels elevated, barrier cream    Ears: red and blanching but intact, foam placed on cannula   Chest: perm cath in place, scar from previous cath clean dry and covered with band-aid, RLQ dressing clean, dry and intact  Groin: intact  Sacrum: open area covered with barrier cream, pillows and q2 t/p   Legs: intact  Heels: intact, elevated on pillow   normal for race

## 2023-06-30 LAB
CULTURE RESULTS: SIGNIFICANT CHANGE UP
SPECIMEN SOURCE: SIGNIFICANT CHANGE UP

## 2023-07-01 DIAGNOSIS — F14.90 COCAINE USE, UNSPECIFIED, UNCOMPLICATED: ICD-10-CM

## 2023-07-01 DIAGNOSIS — R50.9 FEVER, UNSPECIFIED: ICD-10-CM
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
## 2023-07-01 DIAGNOSIS — B35.9 DERMATOPHYTOSIS, UNSPECIFIED: ICD-10-CM

## 2023-07-01 DIAGNOSIS — S61.411A LACERATION WITHOUT FOREIGN BODY OF RIGHT HAND, INITIAL ENCOUNTER: ICD-10-CM
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
## 2023-07-01 DIAGNOSIS — R00.0 TACHYCARDIA, UNSPECIFIED: ICD-10-CM

## 2023-07-04 LAB
CULTURE RESULTS: SIGNIFICANT CHANGE UP
CULTURE RESULTS: SIGNIFICANT CHANGE UP
SPECIMEN SOURCE: SIGNIFICANT CHANGE UP
SPECIMEN SOURCE: SIGNIFICANT CHANGE UP

## 2023-07-20 PROBLEM — Z00.00 ENCOUNTER FOR PREVENTIVE HEALTH EXAMINATION: Status: ACTIVE | Noted: 2023-07-20

## 2023-07-24 PROBLEM — S61.219A FINGER LACERATION: Status: ACTIVE | Noted: 2023-07-24

## 2023-07-24 PROBLEM — Z13.1 DIABETES MELLITUS SCREENING: Status: ACTIVE | Noted: 2023-07-24

## 2023-07-24 PROBLEM — Z11.3 SCREEN FOR STD (SEXUALLY TRANSMITTED DISEASE): Status: ACTIVE | Noted: 2023-07-24

## 2023-07-24 PROBLEM — Z13.220 LIPID SCREENING: Status: ACTIVE | Noted: 2023-07-24

## 2023-07-24 PROBLEM — B35.4 TINEA CORPORIS: Status: ACTIVE | Noted: 2023-07-24

## 2023-07-24 PROBLEM — Z23 ENCOUNTER FOR IMMUNIZATION: Status: ACTIVE | Noted: 2023-07-24
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
## 2023-07-24 NOTE — ASSESSMENT
[FreeTextEntry1] : Health Maintenance\par His BMI is good and no weight loss is currently recommended.\par Daily aerobic exercises strongly recommended.\par No STD risk or substance abuse per patient report.\par Occasional gender specific self-examination is suggested.\par HIV antibody sent with patient approval.\par No depression. Competent with ADLs.\par Colonoscopy is not due this year.\par Yearly flu vaccine is recommended.\par \par History of aggressive behavior when inebriated\par \par \par Tinea\par

## 2023-07-26 ENCOUNTER — APPOINTMENT (OUTPATIENT)
Dept: INTERNAL MEDICINE | Facility: CLINIC | Age: 28
End: 2023-07-26

## 2023-07-26 DIAGNOSIS — Z23 ENCOUNTER FOR IMMUNIZATION: ICD-10-CM

## 2023-07-26 DIAGNOSIS — Z13.220 ENCOUNTER FOR SCREENING FOR LIPOID DISORDERS: ICD-10-CM

## 2023-07-26 DIAGNOSIS — Z13.1 ENCOUNTER FOR SCREENING FOR DIABETES MELLITUS: ICD-10-CM

## 2023-07-26 DIAGNOSIS — B35.4 TINEA CORPORIS: ICD-10-CM

## 2023-07-26 DIAGNOSIS — Z11.3 ENCOUNTER FOR SCREENING FOR INFECTIONS WITH A PREDOMINANTLY SEXUAL MODE OF TRANSMISSION: ICD-10-CM

## 2023-07-26 DIAGNOSIS — S61.219A LACERATION W/OUT FOREIGN BODY OF UNSPECIFIED FINGER W/OUT DAMAGE TO NAIL, INITIAL ENCOUNTER: ICD-10-CM

## 2023-09-01 ENCOUNTER — EMERGENCY (EMERGENCY)
Facility: HOSPITAL | Age: 28
LOS: 1 days | Discharge: HOME | End: 2023-09-01
Admitting: EMERGENCY MEDICINE
Payer: MEDICAID

## 2023-09-01 VITALS
HEART RATE: 82 BPM | OXYGEN SATURATION: 96 % | HEIGHT: 70 IN | RESPIRATION RATE: 18 BRPM | TEMPERATURE: 99 F | DIASTOLIC BLOOD PRESSURE: 63 MMHG | WEIGHT: 154.98 LBS | SYSTOLIC BLOOD PRESSURE: 123 MMHG

## 2023-09-01 VITALS
OXYGEN SATURATION: 97 % | RESPIRATION RATE: 16 BRPM | HEART RATE: 87 BPM | DIASTOLIC BLOOD PRESSURE: 78 MMHG | TEMPERATURE: 97 F | SYSTOLIC BLOOD PRESSURE: 120 MMHG

## 2023-09-01 DIAGNOSIS — B34.9 VIRAL INFECTION, UNSPECIFIED: ICD-10-CM

## 2023-09-01 DIAGNOSIS — S60.456A SUPERFICIAL FOREIGN BODY OF RIGHT LITTLE FINGER, INITIAL ENCOUNTER: ICD-10-CM

## 2023-09-01 DIAGNOSIS — R68.83 CHILLS (WITHOUT FEVER): ICD-10-CM

## 2023-09-01 DIAGNOSIS — B35.4 TINEA CORPORIS: ICD-10-CM

## 2023-09-01 DIAGNOSIS — Z20.822 CONTACT WITH AND (SUSPECTED) EXPOSURE TO COVID-19: ICD-10-CM
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
## 2023-09-01 DIAGNOSIS — F17.200 NICOTINE DEPENDENCE, UNSPECIFIED, UNCOMPLICATED: ICD-10-CM
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
## 2023-09-01 LAB
ALBUMIN SERPL ELPH-MCNC: 4.3 G/DL — SIGNIFICANT CHANGE UP (ref 3.4–5)
ALP SERPL-CCNC: 82 U/L — SIGNIFICANT CHANGE UP (ref 40–120)
ALT FLD-CCNC: 28 U/L — SIGNIFICANT CHANGE UP (ref 12–42)
ANION GAP SERPL CALC-SCNC: 6 MMOL/L — LOW (ref 9–16)
AST SERPL-CCNC: 20 U/L — SIGNIFICANT CHANGE UP (ref 15–37)
BASOPHILS # BLD AUTO: 0.03 K/UL — SIGNIFICANT CHANGE UP (ref 0–0.2)
BASOPHILS NFR BLD AUTO: 0.4 % — SIGNIFICANT CHANGE UP (ref 0–2)
BILIRUB SERPL-MCNC: 0.7 MG/DL — SIGNIFICANT CHANGE UP (ref 0.2–1.2)
BUN SERPL-MCNC: 11 MG/DL — SIGNIFICANT CHANGE UP (ref 7–23)
CALCIUM SERPL-MCNC: 9.2 MG/DL — SIGNIFICANT CHANGE UP (ref 8.5–10.5)
CHLORIDE SERPL-SCNC: 103 MMOL/L — SIGNIFICANT CHANGE UP (ref 96–108)
CO2 SERPL-SCNC: 29 MMOL/L — SIGNIFICANT CHANGE UP (ref 22–31)
CREAT SERPL-MCNC: 1.05 MG/DL — SIGNIFICANT CHANGE UP (ref 0.5–1.3)
EGFR: 100 ML/MIN/1.73M2 — SIGNIFICANT CHANGE UP
EOSINOPHIL # BLD AUTO: 0.05 K/UL — SIGNIFICANT CHANGE UP (ref 0–0.5)
EOSINOPHIL NFR BLD AUTO: 0.7 % — SIGNIFICANT CHANGE UP (ref 0–6)
FLUAV AG NPH QL: SIGNIFICANT CHANGE UP
FLUBV AG NPH QL: SIGNIFICANT CHANGE UP
GLUCOSE SERPL-MCNC: 103 MG/DL — HIGH (ref 70–99)
HCT VFR BLD CALC: 47.1 % — SIGNIFICANT CHANGE UP (ref 39–50)
HGB BLD-MCNC: 15.4 G/DL — SIGNIFICANT CHANGE UP (ref 13–17)
IMM GRANULOCYTES NFR BLD AUTO: 0.3 % — SIGNIFICANT CHANGE UP (ref 0–0.9)
LYMPHOCYTES # BLD AUTO: 1.4 K/UL — SIGNIFICANT CHANGE UP (ref 1–3.3)
LYMPHOCYTES # BLD AUTO: 19.2 % — SIGNIFICANT CHANGE UP (ref 13–44)
MAGNESIUM SERPL-MCNC: 2.3 MG/DL — SIGNIFICANT CHANGE UP (ref 1.6–2.6)
MCHC RBC-ENTMCNC: 31.9 PG — SIGNIFICANT CHANGE UP (ref 27–34)
MCHC RBC-ENTMCNC: 32.7 GM/DL — SIGNIFICANT CHANGE UP (ref 32–36)
MCV RBC AUTO: 97.5 FL — SIGNIFICANT CHANGE UP (ref 80–100)
MONOCYTES # BLD AUTO: 0.62 K/UL — SIGNIFICANT CHANGE UP (ref 0–0.9)
MONOCYTES NFR BLD AUTO: 8.5 % — SIGNIFICANT CHANGE UP (ref 2–14)
NEUTROPHILS # BLD AUTO: 5.17 K/UL — SIGNIFICANT CHANGE UP (ref 1.8–7.4)
NEUTROPHILS NFR BLD AUTO: 70.9 % — SIGNIFICANT CHANGE UP (ref 43–77)
NRBC # BLD: 0 /100 WBCS — SIGNIFICANT CHANGE UP (ref 0–0)
PLATELET # BLD AUTO: 237 K/UL — SIGNIFICANT CHANGE UP (ref 150–400)
POTASSIUM SERPL-MCNC: 4.1 MMOL/L — SIGNIFICANT CHANGE UP (ref 3.5–5.3)
POTASSIUM SERPL-SCNC: 4.1 MMOL/L — SIGNIFICANT CHANGE UP (ref 3.5–5.3)
PROT SERPL-MCNC: 7.6 G/DL — SIGNIFICANT CHANGE UP (ref 6.4–8.2)
RBC # BLD: 4.83 M/UL — SIGNIFICANT CHANGE UP (ref 4.2–5.8)
RBC # FLD: 12.1 % — SIGNIFICANT CHANGE UP (ref 10.3–14.5)
RSV RNA NPH QL NAA+NON-PROBE: SIGNIFICANT CHANGE UP
SARS-COV-2 RNA SPEC QL NAA+PROBE: SIGNIFICANT CHANGE UP
SODIUM SERPL-SCNC: 138 MMOL/L — SIGNIFICANT CHANGE UP (ref 132–145)
WBC # BLD: 7.29 K/UL — SIGNIFICANT CHANGE UP (ref 3.8–10.5)
WBC # FLD AUTO: 7.29 K/UL — SIGNIFICANT CHANGE UP (ref 3.8–10.5)

## 2023-09-01 PROCEDURE — 99284 EMERGENCY DEPT VISIT MOD MDM: CPT

## 2023-09-01 RX ORDER — ACETAMINOPHEN 500 MG
975 TABLET ORAL ONCE
Refills: 0 | Status: COMPLETED | OUTPATIENT
Start: 2023-09-01 | End: 2023-09-01

## 2023-09-01 RX ADMIN — Medication 500 MILLIGRAM(S): at 04:58

## 2023-09-01 RX ADMIN — Medication 975 MILLIGRAM(S): at 04:58

## 2023-09-01 NOTE — ED PROVIDER NOTE - PATIENT PORTAL LINK FT
You can access the FollowMyHealth Patient Portal offered by [facility] by registering at the following website: [email or web address]. By joining [employer]’s FollowMyHealth portal, you will also be able to view your health information using other applications (apps) compatible with our system.

## 2023-09-01 NOTE — ED PROVIDER NOTE - NSFOLLOWUPINSTRUCTIONS_ED_ALL_ED_FT
Viral Syndrome    WHAT YOU NEED TO KNOW:    What is viral syndrome? Viral syndrome is a term used for symptoms of an infection caused by a virus. Viruses are spread easily from person to person through the air and on shared items.    What are the signs and symptoms of viral syndrome? Signs and symptoms may start slowly or suddenly and last hours to days. They can be mild to severe and can change over days or hours. You may have any of the following:  •Fever and chills      •A runny or stuffy nose      •Cough, sore throat, or hoarseness      •Headache, or pain and pressure around your eyes      •Muscle aches and joint pain      •Shortness of breath or wheezing      •Abdominal pain, cramps, and diarrhea      •Nausea, vomiting, or loss of appetite      How is viral syndrome diagnosed and treated? Your healthcare provider will ask about your symptoms and examine you. Antibiotics are not given for a viral infection. The following may help you feel better:   •Acetaminophen decreases pain and fever. It is available without a doctor's order. Ask how much to take and how often to take it. Follow directions. Read the labels of all other medicines you are using to see if they also contain acetaminophen, or ask your doctor or pharmacist. Acetaminophen can cause liver damage if not taken correctly. Do not use more than 4 grams (4,000 milligrams) total of acetaminophen in one day.       •NSAIDs, such as ibuprofen, help decrease swelling, pain, and fever. NSAIDs can cause stomach bleeding or kidney problems in certain people. If you take blood thinner medicine, always ask your healthcare provider if NSAIDs are safe for you. Always read the medicine label and follow directions.      •Cold medicine helps decrease swelling, control a cough, and relieve chest or nasal congestion.      •Saline nasal spray helps relieve congestion in your sinuses.      How can I manage my symptoms?   •Drink liquids as directed to prevent dehydration. Ask how much liquid to drink each day and which liquids are best for you. Ask if you should drink an oral rehydration solution (ORS). An ORS has the right amounts of water, salts, and sugar you need to replace body fluids. This may help prevent dehydration caused by vomiting or diarrhea. Do not drink liquids with caffeine. Liquids with caffeine can make dehydration worse.      •Get plenty of rest to help your body heal. Take naps throughout the day. Ask your healthcare provider when you can return to work and your normal activities.      •Use a cool mist humidifier to help you breathe easier. Ask your healthcare provider how to use a cool mist humidifier.      •Eat honey or use cough drops for a sore throat. Cough drops are available without a doctor's order. Follow directions for taking cough drops.      •Do not smoke or be close to anyone who is smoking. Nicotine and other chemicals in cigarettes and cigars can cause lung damage. Smoking can also delay healing. Ask your healthcare provider for information if you currently smoke and need help to quit. E-cigarettes or smokeless tobacco still contain nicotine. Talk to your healthcare provider before you use these products.      What can I do to prevent the spread of germs?          •Wash your hands often. Wash your hands several times each day. Wash after you use the bathroom, change a child's diaper, and before you prepare or eat food. Use soap and water every time. Rub your soapy hands together, lacing your fingers. Wash the front and back of your hands, and in between your fingers. Use the fingers of one hand to scrub under the fingernails of the other hand. Wash for at least 20 seconds. Rinse with warm, running water for several seconds. Then dry your hands with a clean towel or paper towel. Use hand  that contains alcohol if soap and water are not available. Do not touch your eyes, nose, or mouth without washing your hands first.  Handwashing           •Cover a sneeze or cough. Use a tissue that covers your mouth and nose. Throw the tissue away in a trash can right away. Use the bend of your arm if a tissue is not available. Wash your hands well with soap and water or use a hand .      •Stay away from others while you are sick. Avoid crowds as much as possible.      •Ask about vaccines you may need. Talk to your healthcare provider about your vaccine history. He or she will tell you which vaccines you need, and when to get them.?Get the influenza (flu) vaccine as soon as recommended each year. The flu vaccine is available starting in September or October. Flu viruses change, so it is important to get a flu vaccine every year.      ?Get the pneumonia vaccine if recommended. This vaccine is usually recommended every 5 years. Your provider will tell you when to get this vaccine, if needed.        Call your local emergency number (365 in the [place]) or have someone else call if:   •You have a seizure.      •You cannot be woken.      •You have chest pain or trouble breathing.      When should I seek immediate care?   •You have a stiff neck, a bad headache, and sensitivity to light.      •You feel weak, dizzy, or confused.      •You stop urinating or urinate a lot less than usual.      •You cough up blood or thick yellow or green mucus.      •You have severe abdominal pain or your abdomen is larger than usual.      When should I call my doctor?   •Your symptoms do not get better with treatment or get worse after 3 days.      •You have a rash or ear pain.      •You have burning when you urinate.      •You have questions or concerns about your condition or care    Body Ringworm  Body ringworm is an infection of the skin that often causes a ring-shaped rash. Body ringworm is also called tinea corporis.    Body ringworm can affect any part of your skin. This condition is easily spread from person to person (is very contagious).    What are the causes?  This condition is caused by fungi called dermatophytes. The condition develops when these fungi grow out of control on the skin.    You can get this condition if you touch a person or animal that has it. You can also get it if you share any items with an infected person or pet. These include:  Clothing, bedding, and towels.  Brushes or mack.  Gym equipment.  Any other object that has the fungus on it.  What increases the risk?  You are more likely to develop this condition if you:  Play sports that involve close physical contact, such as wrestling.  Sweat a lot.  Live in areas that are hot and humid.  Use public showers.  Have a weakened disease-fighting system (immune system).  What are the signs or symptoms?  A ring-shaped rash on the skin.  Symptoms of this condition include:  Itchy, raised red spots and bumps.  Red scaly patches.  A ring-shaped rash. The rash may have:  A clear center.  Scales or red bumps at its center.  Redness near its borders.  Dry and scaly skin on or around it.  How is this diagnosed?  This condition can usually be diagnosed with a skin exam. A skin scraping may be taken from the affected area and examined under a microscope to see if the fungus is present.    How is this treated?  This condition may be treated with:  An antifungal cream or ointment.  An antifungal shampoo.  Antifungal medicines. These may be prescribed if your ringworm:  Is severe.  Keeps coming back or lasts a long time.  Follow these instructions at home:  Take over-the-counter and prescription medicines only as told by your health care provider.  If you were given an antifungal cream or ointment:  Use it as told by your health care provider.  Wash the infected area and dry it completely before applying the cream or ointment.  If you were given an antifungal shampoo:  Use it as told by your health care provider.  Leave the shampoo on your body for 3–5 minutes before rinsing.  While you have a rash:  Wear loose clothing to stop clothes from rubbing and irritating it.  Wash or change your bed sheets every night.  Wash clothes and bed sheets in hot water.  Disinfect or throw out items that may be infected.  Wash your hands often with soap and water for at least 20 seconds. If soap and water are not available, use hand .  If your pet has the same infection, take your pet to see a  for treatment.  How is this prevented?  Take a bath or shower every day and after every time you work out or play sports.  Dry your skin completely after bathing.  Wear sandals or shoes in public places and showers.  Wash athletic clothes after each use.  Do not share personal items with others.  Avoid touching red patches of skin on other people.  Avoid touching pets that have bald spots.  If you touch an animal that has a bald spot, wash your hands.  Contact a health care provider if:  Your rash continues to spread after 7 days of treatment.  Your rash is not gone in 4 weeks.  The area around your rash gets red, warm, tender, and swollen.

## 2023-09-01 NOTE — ED ADULT NURSE NOTE - OBJECTIVE STATEMENT
pt reports chills and left fifth digit discomfort which pt states might be related to possible piece of broken glass stuck on finger; no obvious foreign body noted on affected area; distal CSM intact, CRT <2secs on both arms/hands; recent surgery on same left fifth finger around a month ago

## 2023-09-01 NOTE — ED PROVIDER NOTE - PHYSICAL EXAMINATION
Gen - WDWN M, NAD, comfortable and non-toxic appearing  Skin - warm, dry, intact, prior wounds to the r hand well healed, C/D/I, no focal induration/streaking/warmth/crepitus   HEENT - AT/NC, PERRL, conjunctiva clear, o/p clear without erythema/exduate, TM intact with no bulging or dc, no nasal discharge, airway patent, neck supple and FROM  CV - S1S2, R/R/R  Resp - CTAB, no r/r/w  GI - soft, ND, NT, no CVAT b/l   MS - No acute or gross deformities noted to extremities. FROM, NV Intact, +SILT, No midline spinal tenderness or step off on palpation  Neuro - AxOx3, no focal neuro deficits, ambulatory without gait disturbance

## 2023-09-01 NOTE — ED PROVIDER NOTE - OBJECTIVE STATEMENT
28 yo M with no known PMHx, s/p R hand injury with known retained FB in the 5th digit 2 months ago, lost to follow up due to insurance issues, presenting today c/o chills and one episode of watery diarrhea this morning. Pt reports waking up with some chills and one episode of NB diarrhea. Sx went away upon arrival to the ED. Denies fever, sore throat, SOB, CP, palpitations, wheezing, abdominal pain, N/V/C, change in urinary function, dysuria, hematuria, flank pain, malaise, worsening rash, HA, and dizziness.  No recent travel or sick contact noted. Has a chronic rash to the LLE x 6 months

## 2023-09-01 NOTE — ED PROVIDER NOTE - CARE PLAN
Principal Discharge DX:	Viral syndrome  Secondary Diagnosis:	Tinea corporis  Secondary Diagnosis:	Retained foreign body of finger   1

## 2023-09-01 NOTE — ED PROVIDER NOTE - CARE PROVIDERS DIRECT ADDRESSES
,DirectAddress_Unknown,DirectAddress_Unknown,[email or web address] [place].[email or web address] [place],[email or web address] [place]og[facility] Memorial HospitalBuzzeroNew Mexico Behavioral [facility] Institute at [facility]

## 2023-09-01 NOTE — ED PROVIDER NOTE - PROVIDER TOKENS
PROVIDER:[TOKEN:[[id number]]],PROVIDER:[TOKEN:[[id number]]],PROVIDER:[TOKEN:[[id number]]],PROVIDER:[TOKEN:[[id number]]],PROVIDER:[TOKEN:[[id number]]]

## 2023-09-01 NOTE — ED PROVIDER NOTE - CARE PROVIDER_API CALL
Jazmin Fields  Surgery of the Hand  [address], [address]  [place] [address]  Phone: [phone]  Fax: [phone]  Follow Up Time:     Ludwin Felix  Plastic Surgery  [address] [place] [address]  Phone: [phone]  Fax: [phone]  Follow Up Time:     Shyanne Nagy  Infectious Disease  [address] [place] [address]  Phone: [phone]  Fax: [phone]  Follow Up Time:     Felicity Chanel  Dermatology  [address], [place] [address]  Phone: [phone]  Fax: [phone]  Follow Up Time:     Denise Montemayor  Internal Medicine  [address] [address]  Phone: [phone]  Fax: [phone]  Follow Up Time:

## 2023-09-01 NOTE — ED ADULT TRIAGE NOTE - CHIEF COMPLAINT QUOTE
Walk in pt with complaints of chills tonight that he states went away while he was on his way here. Also endorses pain to left 5th digit where he states there is a piece of glass but he also had surgery.

## 2023-09-01 NOTE — ED PROVIDER NOTE - CLINICAL SUMMARY MEDICAL DECISION MAKING FREE TEXT BOX
pt p/w sx suggestive of viral syndrome, woke up with chills and one episode of diarrhea today, afebrile and no other associated pain or sx. well appearing and hemodynamically stable, s/p supportive care with improvement, no s/s of bacterial infection on exam or labs, flu/covid neg, pt encouraged supportive care and prompt f/u with clinic. Also endorses chronic R hand pain with retained FB x 2 months and LLE rash x 6 months, suggestive of fungal infection, appropriate f/u provided

## 2024-02-19 NOTE — ED ADULT TRIAGE NOTE - BEFAST SCREENING
What Type Of Note Output Would You Prefer (Optional)?: Standard Output
Hpi Title: Evaluation of Skin Lesions
Additional History: Patient mentioned that a spot on right upper lip that was previously frozen has not gone away.
Negative

## 2024-08-10 ENCOUNTER — EMERGENCY (EMERGENCY)
Facility: HOSPITAL | Age: 29
LOS: 1 days | Discharge: HOME | End: 2024-08-10
Attending: EMERGENCY MEDICINE | Admitting: EMERGENCY MEDICINE
Payer: MEDICAID

## 2024-08-10 VITALS
RESPIRATION RATE: 16 BRPM | SYSTOLIC BLOOD PRESSURE: 115 MMHG | TEMPERATURE: 99 F | HEIGHT: 71 IN | HEART RATE: 78 BPM | DIASTOLIC BLOOD PRESSURE: 81 MMHG | WEIGHT: 154.98 LBS | OXYGEN SATURATION: 96 %

## 2024-08-10 DIAGNOSIS — Z76.0 ENCOUNTER FOR ISSUE OF REPEAT PRESCRIPTION: ICD-10-CM

## 2024-08-10 PROCEDURE — 99283 EMERGENCY DEPT VISIT LOW MDM: CPT

## 2024-08-10 RX ORDER — LAMOTRIGINE 100 MG/1
25 TABLET ORAL ONCE
Refills: 0 | Status: COMPLETED | OUTPATIENT
Start: 2024-08-10 | End: 2024-08-10

## 2024-08-10 RX ORDER — LAMOTRIGINE 100 MG/1
1 TABLET ORAL
Qty: 14 | Refills: 0
Start: 2024-08-10 | End: 2024-08-16

## 2024-08-10 RX ADMIN — LAMOTRIGINE 25 MILLIGRAM(S): 100 TABLET ORAL at 13:09

## 2024-08-10 NOTE — ED PROVIDER NOTE - CLINICAL SUMMARY MEDICAL DECISION MAKING FREE TEXT BOX
27 yo M presented to the ED for medication refill of his Lamictal. Pt given am dose in the ED and dc home with 1 week supply.

## 2024-08-10 NOTE — ED PROVIDER NOTE - PHYSICAL EXAMINATION
Const: No apparent distress  Eyes: PERRL, no conjunctival injection  HENT:  Neck supple without meningismus   CV: RRR, Warm, well-perfused extremities  RESP: CTA B/L, no tachypnea   MSK: No gross deformities appreciated  Skin: Warm, dry. No rashes  Neuro: Alert, CNs II-XII grossly intact. Sensation and motor function of extremities grossly intact.  Psych: Appropriate mood and affect.

## 2024-08-10 NOTE — ED PROVIDER NOTE - OBJECTIVE STATEMENT
27 yo M presented to the ED for refill of his Lamictal. Pt's last dose was last night. He was supposed to get a refill from his physician but his appointment got cancelled. He is requesting 1 week to tide him over until he is able to get a refill. No other concerns at this time.

## 2024-08-10 NOTE — ED PROVIDER NOTE - PATIENT PORTAL LINK FT
You can access the FollowMyHealth Patient Portal offered by [facility] by registering at the following website: [email or web address]. By joining [employer]’s FollowMyHealth portal, you will also be able to view your health information using other applications (apps) compatible with our system.

## 2024-08-18 ENCOUNTER — EMERGENCY (EMERGENCY)
Facility: HOSPITAL | Age: 29
LOS: 1 days | Discharge: HOME | End: 2024-08-18
Admitting: EMERGENCY MEDICINE
Payer: MEDICAID

## 2024-08-18 VITALS
OXYGEN SATURATION: 97 % | SYSTOLIC BLOOD PRESSURE: 117 MMHG | RESPIRATION RATE: 16 BRPM | TEMPERATURE: 98 F | HEART RATE: 74 BPM | DIASTOLIC BLOOD PRESSURE: 81 MMHG | HEIGHT: 71 IN

## 2024-08-18 DIAGNOSIS — Z76.0 ENCOUNTER FOR ISSUE OF REPEAT PRESCRIPTION: ICD-10-CM

## 2024-08-18 DIAGNOSIS — F39 UNSPECIFIED MOOD [AFFECTIVE] DISORDER: ICD-10-CM

## 2024-08-18 PROCEDURE — 99283 EMERGENCY DEPT VISIT LOW MDM: CPT

## 2024-08-18 RX ORDER — LAMOTRIGINE 100 MG/1
1 TABLET ORAL
Qty: 14 | Refills: 0
Start: 2024-08-18 | End: 2024-08-24

## 2024-08-18 RX ORDER — LAMOTRIGINE 100 MG/1
25 TABLET ORAL ONCE
Refills: 0 | Status: COMPLETED | OUTPATIENT
Start: 2024-08-18 | End: 2024-08-18

## 2024-08-18 RX ADMIN — LAMOTRIGINE 25 MILLIGRAM(S): 100 TABLET ORAL at 19:02

## 2024-08-18 NOTE — ED ADULT TRIAGE NOTE - CHIEF COMPLAINT QUOTE
patient walk in asking for medication refill of Lamictal 25mg; was seen here on 8/10 for same complaint and rx was sent to pharmacy for 1 week; has appointment with MD tomorrow

## 2024-08-18 NOTE — ED PROVIDER NOTE - OBJECTIVE STATEMENT
27 yo male hx mood disorder, requesting medication refill of lamictal as he states his psychiatry appointment was moved to tomorrow and he ran out. denies SI or HI or hallucinations. recently had ED visit where he was given med refill for lamictal 1 week supply.

## 2024-08-18 NOTE — ED PROVIDER NOTE - PATIENT PORTAL LINK FT
You can access the FollowMyHealth Patient Portal offered by [facility] by registering at the following website: [email or web address]. By joining [employer]’s FollowMyHealth portal, you will also be able to view your health information using other applications (apps) compatible with our system.

## 2024-09-03 NOTE — ED PROVIDER NOTE - CLINICAL SUMMARY MEDICAL DECISION MAKING FREE TEXT BOX
Patient coming in 9/12 to get labs done, please put orders in  
done  
27M history of constipation and hemorrhoids presents for evaluation of these chronic conditions. Patient passing hard stool and straining to do so. Denies blood in stool, nausea/vomiting, fevers/chills, lightheadedness/dizziness. Abdominal discomfort but denies overt pain.    PE: PE: A&Ox3, well appearing, NAD, NCAT, regular respiratory effort, moving all four extremities equally. Abd soft ntnd. Rectal exam with sign of small, pea-sized hemorrhoid, non-thrombosed.    MDM: Patient with non-thrombosed external hemorrhoid likely from constipation symptoms. No acute intervention required but will discharge with medications for patients symptoms and information for follow up.

## 2024-09-06 ENCOUNTER — EMERGENCY (EMERGENCY)
Facility: HOSPITAL | Age: 29
LOS: 1 days | Discharge: HOME | End: 2024-09-06
Admitting: EMERGENCY MEDICINE
Payer: MEDICAID

## 2024-09-06 VITALS
RESPIRATION RATE: 16 BRPM | OXYGEN SATURATION: 97 % | TEMPERATURE: 98 F | HEIGHT: 71 IN | HEART RATE: 100 BPM | DIASTOLIC BLOOD PRESSURE: 78 MMHG | SYSTOLIC BLOOD PRESSURE: 120 MMHG

## 2024-09-06 DIAGNOSIS — Z76.0 ENCOUNTER FOR ISSUE OF REPEAT PRESCRIPTION: ICD-10-CM

## 2024-09-06 DIAGNOSIS — F31.9 BIPOLAR DISORDER, UNSPECIFIED: ICD-10-CM

## 2024-09-06 PROCEDURE — 99283 EMERGENCY DEPT VISIT LOW MDM: CPT

## 2024-09-06 RX ORDER — LAMOTRIGINE 100 MG/1
1 TABLET, EXTENDED RELEASE ORAL
Qty: 60 | Refills: 0
Start: 2024-09-06 | End: 2024-10-05

## 2024-09-06 RX ORDER — VENLAFAXINE HYDROCHLORIDE 150 MG/1
1 CAPSULE, EXTENDED RELEASE ORAL
Qty: 60 | Refills: 0
Start: 2024-09-06 | End: 2024-10-05

## 2024-09-06 NOTE — ED PROVIDER NOTE - CLINICAL SUMMARY MEDICAL DECISION MAKING FREE TEXT BOX
27 yo M, pmhx bipolar disorder, requesting refill of his chronic meds; takes Effexor 37.5mg BID and Lamictal 25mg BID. Rxs sent. Psych f/u encouraged. Patient stable on DC.

## 2024-09-06 NOTE — ED PROVIDER NOTE - OBJECTIVE STATEMENT
29 yo M, pmhx bipolar disorder, requesting refill of his chronic meds; takes Effexor 37.5mg BID and Lamictal 25mg BID. Pt has been unable to make a fu appt w/ is psychiatrist. Denies active complaints.

## 2024-09-06 NOTE — ED PROVIDER NOTE - PATIENT PORTAL LINK FT
You can access the FollowMyHealth Patient Portal offered by [facility] by registering at the following website: [email or web address]. By joining [employer]’s FollowMyHealth portal, you will also be able to view your health information using other applications (apps) compatible with our system.

## 2024-09-06 NOTE — ED PROVIDER NOTE - NSDCPRINTRESULTS_ED_ALL_ED
4/16/2024      David Doshi  [address] [place] [id number]                        This is to certify that David Doshi has been seen by [facility] [address]  and is unable to return to work.  I anticipate he will be able to returned to work on 04/29/2024 with the following restrictions:  Sit-down work, and he will need to be able to elevate his right ankle for pain and swelling.            Electronically signed by:  Vikas Fehrman, MD  [facility] [address]  [address] [facility] [address]  Dept Phone: [phone]    Patient requests all Lab, Cardiology, and Radiology Results on their Discharge Instructions

## 2024-09-06 NOTE — ED ADULT NURSE NOTE - OBJECTIVE STATEMENT
Pt presents to ED for medication refill. As per pt, PMH bipolar disorder and is requesting refill for effexor and lamictal. Pt reports he was unable to make follow up appointment with specialist for refill. Denies any medical complaints at this time.

## 2024-11-18 ENCOUNTER — EMERGENCY (EMERGENCY)
Facility: HOSPITAL | Age: 29
LOS: 1 days | Discharge: HOME | End: 2024-11-18
Attending: EMERGENCY MEDICINE | Admitting: EMERGENCY MEDICINE
Payer: MEDICAID

## 2024-11-18 VITALS
RESPIRATION RATE: 16 BRPM | OXYGEN SATURATION: 97 % | HEART RATE: 94 BPM | TEMPERATURE: 98 F | DIASTOLIC BLOOD PRESSURE: 66 MMHG | SYSTOLIC BLOOD PRESSURE: 109 MMHG

## 2024-11-18 VITALS
WEIGHT: 154.98 LBS | OXYGEN SATURATION: 96 % | RESPIRATION RATE: 16 BRPM | HEIGHT: 70 IN | DIASTOLIC BLOOD PRESSURE: 69 MMHG | HEART RATE: 116 BPM | TEMPERATURE: 100 F | SYSTOLIC BLOOD PRESSURE: 112 MMHG

## 2024-11-18 DIAGNOSIS — R07.0 PAIN IN THROAT: ICD-10-CM

## 2024-11-18 DIAGNOSIS — J03.90 ACUTE TONSILLITIS, UNSPECIFIED: ICD-10-CM

## 2024-11-18 LAB
ALBUMIN SERPL ELPH-MCNC: 3.8 G/DL — SIGNIFICANT CHANGE UP (ref 3.4–5)
ALP SERPL-CCNC: 61 U/L — SIGNIFICANT CHANGE UP (ref 40–120)
ALT FLD-CCNC: 24 U/L — SIGNIFICANT CHANGE UP (ref 12–42)
ANION GAP SERPL CALC-SCNC: 8 MMOL/L — LOW (ref 9–16)
APTT BLD: 30.4 SEC — SIGNIFICANT CHANGE UP (ref 24.5–35.6)
AST SERPL-CCNC: 16 U/L — SIGNIFICANT CHANGE UP (ref 15–37)
BASOPHILS # BLD AUTO: 0.04 K/UL — SIGNIFICANT CHANGE UP (ref 0–0.2)
BASOPHILS NFR BLD AUTO: 0.3 % — SIGNIFICANT CHANGE UP (ref 0–2)
BILIRUB SERPL-MCNC: 0.8 MG/DL — SIGNIFICANT CHANGE UP (ref 0.2–1.2)
BUN SERPL-MCNC: 15 MG/DL — SIGNIFICANT CHANGE UP (ref 7–23)
CALCIUM SERPL-MCNC: 8.8 MG/DL — SIGNIFICANT CHANGE UP (ref 8.5–10.5)
CHLORIDE SERPL-SCNC: 105 MMOL/L — SIGNIFICANT CHANGE UP (ref 96–108)
CO2 SERPL-SCNC: 25 MMOL/L — SIGNIFICANT CHANGE UP (ref 22–31)
CREAT SERPL-MCNC: 1.15 MG/DL — SIGNIFICANT CHANGE UP (ref 0.5–1.3)
EGFR: 88 ML/MIN/1.73M2 — SIGNIFICANT CHANGE UP
EOSINOPHIL # BLD AUTO: 0.03 K/UL — SIGNIFICANT CHANGE UP (ref 0–0.5)
EOSINOPHIL NFR BLD AUTO: 0.2 % — SIGNIFICANT CHANGE UP (ref 0–6)
FLUAV AG NPH QL: SIGNIFICANT CHANGE UP
FLUBV AG NPH QL: SIGNIFICANT CHANGE UP
GLUCOSE SERPL-MCNC: 135 MG/DL — HIGH (ref 70–99)
HCT VFR BLD CALC: 43.1 % — SIGNIFICANT CHANGE UP (ref 39–50)
HETEROPH AB TITR SER AGGL: NEGATIVE — SIGNIFICANT CHANGE UP
HGB BLD-MCNC: 14.3 G/DL — SIGNIFICANT CHANGE UP (ref 13–17)
HIV 1 & 2 AB SERPL IA.RAPID: SIGNIFICANT CHANGE UP
IMM GRANULOCYTES NFR BLD AUTO: 0.5 % — SIGNIFICANT CHANGE UP (ref 0–0.9)
INR BLD: 1.03 — SIGNIFICANT CHANGE UP (ref 0.85–1.16)
LACTATE BLDV-MCNC: 1.5 MMOL/L — SIGNIFICANT CHANGE UP (ref 0.5–2)
LYMPHOCYTES # BLD AUTO: 0.61 K/UL — LOW (ref 1–3.3)
LYMPHOCYTES # BLD AUTO: 4.5 % — LOW (ref 13–44)
MCHC RBC-ENTMCNC: 30.4 PG — SIGNIFICANT CHANGE UP (ref 27–34)
MCHC RBC-ENTMCNC: 33.2 G/DL — SIGNIFICANT CHANGE UP (ref 32–36)
MCV RBC AUTO: 91.7 FL — SIGNIFICANT CHANGE UP (ref 80–100)
MONOCYTES # BLD AUTO: 1.19 K/UL — HIGH (ref 0–0.9)
MONOCYTES NFR BLD AUTO: 8.7 % — SIGNIFICANT CHANGE UP (ref 2–14)
NEUTROPHILS # BLD AUTO: 11.7 K/UL — HIGH (ref 1.8–7.4)
NEUTROPHILS NFR BLD AUTO: 85.8 % — HIGH (ref 43–77)
NRBC # BLD: 0 /100 WBCS — SIGNIFICANT CHANGE UP (ref 0–0)
PLATELET # BLD AUTO: 182 K/UL — SIGNIFICANT CHANGE UP (ref 150–400)
POTASSIUM SERPL-MCNC: 3.8 MMOL/L — SIGNIFICANT CHANGE UP (ref 3.5–5.3)
POTASSIUM SERPL-SCNC: 3.8 MMOL/L — SIGNIFICANT CHANGE UP (ref 3.5–5.3)
PROT SERPL-MCNC: 6.9 G/DL — SIGNIFICANT CHANGE UP (ref 6.4–8.2)
PROTHROM AB SERPL-ACNC: 11.9 SEC — SIGNIFICANT CHANGE UP (ref 9.9–13.4)
RBC # BLD: 4.7 M/UL — SIGNIFICANT CHANGE UP (ref 4.2–5.8)
RBC # FLD: 12.1 % — SIGNIFICANT CHANGE UP (ref 10.3–14.5)
RSV RNA NPH QL NAA+NON-PROBE: SIGNIFICANT CHANGE UP
S PYO AG SPEC QL IA: NEGATIVE — SIGNIFICANT CHANGE UP
SARS-COV-2 RNA SPEC QL NAA+PROBE: SIGNIFICANT CHANGE UP
SODIUM SERPL-SCNC: 138 MMOL/L — SIGNIFICANT CHANGE UP (ref 132–145)
WBC # BLD: 13.64 K/UL — HIGH (ref 3.8–10.5)
WBC # FLD AUTO: 13.64 K/UL — HIGH (ref 3.8–10.5)

## 2024-11-18 PROCEDURE — 99285 EMERGENCY DEPT VISIT HI MDM: CPT

## 2024-11-18 PROCEDURE — 70491 CT SOFT TISSUE NECK W/DYE: CPT | Mod: 26,MC

## 2024-11-18 RX ORDER — SODIUM CHLORIDE 9 MG/ML
2200 INJECTION, SOLUTION INTRAMUSCULAR; INTRAVENOUS; SUBCUTANEOUS ONCE
Refills: 0 | Status: COMPLETED | OUTPATIENT
Start: 2024-11-18 | End: 2024-11-18

## 2024-11-18 RX ORDER — METHYLPREDNISOLONE ACETATE 80 MG/ML
1 INJECTION, SUSPENSION INTRALESIONAL; INTRAMUSCULAR; INTRASYNOVIAL; SOFT TISSUE
Qty: 1 | Refills: 0
Start: 2024-11-18 | End: 2024-11-23

## 2024-11-18 RX ORDER — AMOXICILLIN AND CLAVULANATE POTASSIUM 600; 42.9 MG/5ML; MG/5ML
875 POWDER, FOR SUSPENSION ORAL
Qty: 20 | Refills: 0
Start: 2024-11-18 | End: 2024-11-27

## 2024-11-18 RX ORDER — AMOXICILLIN AND CLAVULANATE POTASSIUM 600; 42.9 MG/5ML; MG/5ML
1 POWDER, FOR SUSPENSION ORAL ONCE
Refills: 0 | Status: COMPLETED | OUTPATIENT
Start: 2024-11-18 | End: 2024-11-18

## 2024-11-18 RX ORDER — ACETAMINOPHEN 500 MG
650 TABLET ORAL ONCE
Refills: 0 | Status: COMPLETED | OUTPATIENT
Start: 2024-11-18 | End: 2024-11-18

## 2024-11-18 RX ORDER — AZITHROMYCIN DIHYDRATE 200 MG/5ML
1 POWDER, FOR SUSPENSION ORAL
Qty: 4 | Refills: 0
Start: 2024-11-18 | End: 2024-11-21

## 2024-11-18 RX ORDER — AZITHROMYCIN DIHYDRATE 200 MG/5ML
500 POWDER, FOR SUSPENSION ORAL ONCE
Refills: 0 | Status: COMPLETED | OUTPATIENT
Start: 2024-11-18 | End: 2024-11-18

## 2024-11-18 RX ORDER — KETOROLAC TROMETHAMINE 30 MG/ML
15 INJECTION INTRAMUSCULAR; INTRAVENOUS ONCE
Refills: 0 | Status: DISCONTINUED | OUTPATIENT
Start: 2024-11-18 | End: 2024-11-18

## 2024-11-18 RX ORDER — DEXAMETHASONE 1.5 MG 1.5 MG/1
10 TABLET ORAL ONCE
Refills: 0 | Status: COMPLETED | OUTPATIENT
Start: 2024-11-18 | End: 2024-11-18

## 2024-11-18 RX ORDER — AMPICILLIN AND SULBACTAM 2; 1 G/1; G/1
3 INJECTION, POWDER, FOR SOLUTION INTRAMUSCULAR; INTRAVENOUS ONCE
Refills: 0 | Status: COMPLETED | OUTPATIENT
Start: 2024-11-18 | End: 2024-11-18

## 2024-11-18 RX ADMIN — Medication 650 MILLIGRAM(S): at 19:54

## 2024-11-18 RX ADMIN — DEXAMETHASONE 1.5 MG 102 MILLIGRAM(S): 1.5 TABLET ORAL at 17:11

## 2024-11-18 RX ADMIN — AMOXICILLIN AND CLAVULANATE POTASSIUM 1 TABLET(S): 600; 42.9 POWDER, FOR SUSPENSION ORAL at 19:54

## 2024-11-18 RX ADMIN — AMPICILLIN AND SULBACTAM 200 GRAM(S): 2; 1 INJECTION, POWDER, FOR SOLUTION INTRAMUSCULAR; INTRAVENOUS at 17:11

## 2024-11-18 RX ADMIN — KETOROLAC TROMETHAMINE 15 MILLIGRAM(S): 30 INJECTION INTRAMUSCULAR; INTRAVENOUS at 17:11

## 2024-11-18 RX ADMIN — AZITHROMYCIN DIHYDRATE 500 MILLIGRAM(S): 200 POWDER, FOR SUSPENSION ORAL at 19:54

## 2024-11-18 RX ADMIN — SODIUM CHLORIDE 2200 MILLILITER(S): 9 INJECTION, SOLUTION INTRAMUSCULAR; INTRAVENOUS; SUBCUTANEOUS at 16:40

## 2024-11-18 NOTE — ED PROVIDER NOTE - PATIENT PORTAL LINK FT
You can access the FollowMyHealth Patient Portal offered by [facility] by registering at the following website: [email or web address]. By joining [employer]’s FollowMyHealth portal, you will also be able to view your health information using other applications (apps) compatible with our system.

## 2024-11-18 NOTE — ED PROVIDER NOTE - NSFOLLOWUPINSTRUCTIONS_ED_ALL_ED_FT
Your CAT scan showed a small peritonsillar abscess and a small infiltrate (pneumonia).  Please take the antibiotics as prescribed.  Please follow up with the Ear Nose Throat (ENT) doctor and the lung (pulmonary) doctor and your primary care doctor this week.  Please return right away if you do not improve or if you worsen.    You may take ibuprofen 600mg or Tylenol 650mg every 6 hours for pain or fever.      Community Acquired Pneumonia    WHAT YOU NEED TO KNOW:    Community-acquired pneumonia (CAP) is a lung infection that you get outside of a hospital or nursing home setting. Your lungs become inflamed and cannot work well. CAP may be caused by bacteria, viruses, or fungi.  The Lungs    DISCHARGE INSTRUCTIONS:    Seek care immediately if:    You are confused and cannot think clearly.    You have increased trouble breathing.    Your lips or fingernails turn gray or blue.  Call your doctor if:    Your symptoms do not get better, or they get worse.    You are urinating less, or not at all.    You have questions or concerns about your condition or care.  Medicines:    Medicines may be given to treat a bacterial, viral, or fungal infection. You may also be given medicines to dilate your bronchial tubes to help you breathe more easily.    Take your medicine as directed. Contact your healthcare provider if you think your medicine is not helping or if you have side effects. Tell your provider if you are allergic to any medicine. Keep a list of the medicines, vitamins, and herbs you take. Include the amounts, and when and why you take them. Bring the list or the pill bottles to follow-up visits. Carry your medicine list with you in case of an emergency.  Manage CAP at home:    Get plenty of rest. Rest helps your body heal.    Do not smoke or allow others to smoke around you. Nicotine and other chemicals in cigarettes and cigars can cause lung damage. Ask your healthcare provider for information if you currently smoke and need help to quit. E-cigarettes or smokeless tobacco still contain nicotine. Talk to your healthcare provider before you use these products.    Breathe warm, moist air. This helps loosen mucus. Loosely place a warm, wet washcloth over your nose and mouth. A room humidifier may also help make the air moist.    Gently tap your chest. This helps loosen mucus so it is easier to cough up.    Take deep breaths and cough. Deep breathing helps open the air passages in your lungs. Coughing helps bring up mucus from your lungs. Take a deep breath and hold the breath as long as you can. Then push the air out of your lungs with a deep, strong cough. Spit out any mucus you have coughed up. Take 10 deep breaths in a row every hour that you are awake. Remember to follow each deep breath with a cough.    Drink liquids as directed. Ask your healthcare provider how much liquid to drink each day and which liquids to drink. Liquids help make mucus thin and easier to get out of your body.  Prevent CAP:    Wash your hands often. Use soap for at least 20 seconds. Rinse with warm running water. Dry your hands with a clean towel or paper towel. Use hand  that contains alcohol if soap and water are not available. Wash your hands several times each day. Wash after you use the bathroom, change a child's diaper, and before you prepare or eat food. Do not touch your eyes, nose, or mouth without washing your hands first.  Handwashing      Cover a sneeze or cough. Use a tissue that covers your mouth and nose. Throw the tissue away in a trash can right away. Use the bend of your arm if a tissue is not available. Wash your hands well with soap and water or use a hand . Do not stand close to anyone who is sneezing or coughing.    Clean surfaces often. Clean doorknobs, countertops, cell phones, and other surfaces that are touched often. Use a disinfecting wipe, a single-use sponge, or a cloth you can wash and reuse. Use disinfecting  if you do not have wipes. You can create a disinfecting  by mixing 1 part bleach with 10 parts water.    Try to avoid people who have a cold or the flu. If you are sick, stay away from others as much as possible.    Ask about vaccines you may need. A pneumonia vaccine can help lower your risk for pneumonia. The vaccine may be recommended every 5 years, starting at age 65. Vaccines help lower the risk for infections that can become serious for a person who has pneumonia. Get a flu vaccine each year as soon as recommended, usually in September or October. Get a COVID-19 vaccine and booster as directed. Your healthcare provider can tell you if you should also get other vaccines, and when to get them.    Peritonsillar Abscess    WHAT YOU NEED TO KNOW:    A peritonsillar abscess (PTA) is a collection of pus in the peritonsillar space. The peritonsillar space is the area between your tonsil and the back wall of your throat. It is near the opening of the tubes leading to your stomach and lungs.    DISCHARGE INSTRUCTIONS:    Call your local emergency number (911 in the [place]) if:    You have trouble breathing.    Return to the emergency department if:    You have a stiff neck.    You have bleeding in your throat.    You have more pain, swelling, or redness in your throat.    You have more trouble opening your mouth or pain when you swallow.    You cannot eat or drink because of your symptoms.  Call your doctor if:    You have a new or worsening fever.    Your abscess returns.    You have questions or concerns about your condition or care.  Medicines: You may need any of the following:    Antibiotics help treat or prevent a bacterial infection.    Acetaminophen decreases pain and fever. It is available without a doctor's order. Ask how much to take and how often to take it. Follow directions. Read the labels of all other medicines you are using to see if they also contain acetaminophen, or ask your doctor or pharmacist. Acetaminophen can cause liver damage if not taken correctly.    Steroids decrease swelling.    NSAIDs, such as ibuprofen, help decrease swelling, pain, and fever. This medicine is available with or without a doctor's order. NSAIDs can cause stomach bleeding or kidney problems in certain people. If you take blood thinner medicine, always ask if NSAIDs are safe for you. Always read the medicine label and follow directions. Do not give these medicines to children younger than 6 months without direction from a healthcare provider.    Take your medicine as directed. Contact your healthcare provider if you think your medicine is not helping or if you have side effects. Tell your provider if you are allergic to any medicine. Keep a list of the medicines, vitamins, and herbs you take. Include the amounts, and when and why you take them. Bring the list or the pill bottles to follow-up visits. Carry your medicine list with you in case of an emergency.  Manage your symptoms:    Eat foods that are easy to swallow. Your healthcare provider may recommend a soft diet. A soft diet includes moist foods that may be ground, mashed, or pureed.    Drink liquids as directed. Try to drink liquids throughout the day. Liquids will help prevent dehydration.    Gargle salt water, if directed. Mix ¼ teaspoon salt in an 8 ounce glass of warm water and gargle. Do not swallow. Salt water may help decrease swelling in your throat. Your provider will tell you how often to do this each day, and for how many days to continue.  Prevent a PTA:    Care for your mouth and teeth. Brush and floss your teeth after you eat, and before you go to sleep. Gently brush your teeth and gums using a brush with soft bristles. Use a mouth rinse after you brush. See your dentist for regular check-ups.    Do not delay treatment for a sore throat. Make an appointment to see your doctor if you have a sore throat that continues for more than a few days. If you have a fever with a sore throat, call your doctor that day. Early treatment may prevent a peritonsillar abscess. Take your antibiotic for throat infections until it is done.    Do not smoke. Nicotine and other chemicals in cigarettes and cigars may increase your risk for a peritonsillar abscess. Ask your healthcare provider for information if you currently smoke and need help to quit. E-cigarettes or smokeless tobacco still contain nicotine. Talk to your healthcare provider before you use these products.

## 2024-11-18 NOTE — ED PROVIDER NOTE - AXIS
"Patient c/o N/V and fatigue.  Patient encouraged to take the zofran (has not been taking it).  No cramping or VB.  Mood is \"mostly good\".  Patient declines MaterniT 21 testing.  " Normal

## 2024-11-18 NOTE — ED PROVIDER NOTE - OBJECTIVE STATEMENT
Patient is a 29-year-old male complaining of throat pain for 1 day worsening rapidly.  Patient with pain on swallowing, difficulty swallowing.  Pain worse on the left side.  On examination tonsils are kissing and there is purulent exudate on the left tonsil.  Sepsis bundle ordered including Unasyn, dexamethasone, Toradol.  CT neck and soft tissue's ordered to evaluate for possibility of peritonsillar abscess.  Patient states he has no allergies to medications.

## 2024-11-18 NOTE — ED PROVIDER NOTE - CLINICAL SUMMARY MEDICAL DECISION MAKING FREE TEXT BOX
Patient is a 29-year-old male complaining of throat pain for 1 day worsening rapidly.  Patient with pain on swallowing, difficulty swallowing.  Pain worse on the left side.  On examination tonsils are kissing and there is purulent exudate on the left tonsil.  Sepsis bundle ordered including Unasyn, dexamethasone, Toradol.  CT neck and soft tissue's ordered to evaluate for possibility of peritonsillar abscess.  Patient states he has no allergies to medications. Patient is a 29-year-old male complaining of throat pain for 1 day worsening rapidly.  Patient with pain on swallowing, difficulty swallowing.  Pain worse on the left side.  On examination tonsils are kissing and there is purulent exudate on the left tonsil.  Sepsis bundle ordered including Unasyn, dexamethasone, Toradol.  CT neck and soft tissue's ordered to evaluate for possibility of peritonsillar abscess.  Patient states he has no allergies to medications.    7:35pm CT shows possible infiltrate on R lung and L sided small peritonsillar abscess, patient has improved with meds and has no difficulty swallowing or speaking, tolerating PO, will give oral abx and medrok dosepak and follow up closely with ENT and pulm

## 2024-11-18 NOTE — ED PROVIDER NOTE - CONSTITUTIONAL APPEARANCE HYGIENE, MLM
Discharge orders received from MD. Discharge instructions given to pt and pt verbalized understanding. D/c home accompanied by .
Pt presents to [facility] with c/o pressure and bacl pain. Denies VB, LOF, and states +FM.
good hygiene

## 2024-11-18 NOTE — ED PROVIDER NOTE - CARE PLAN
1 Principal Discharge DX:	Acute suppurative tonsillitis   Principal Discharge DX:	Acute suppurative tonsillitis  Secondary Diagnosis:	Peritonsillar abscess

## 2024-11-18 NOTE — ED PROVIDER NOTE - NSPTACCESSSVCSAPPTDETAILS_ED_ALL_ED_FT
Dr Dumont ENT for small peritonsilar abscess  Dr Dean or any of her partners for lung infiltrate on CT  primary care Dr Gould if possible

## 2024-11-18 NOTE — ED ADULT NURSE NOTE - OBJECTIVE STATEMENT
29 year old male is complaining of throat swelling, difficulty swallowing and fever starting overnight. Airway intact. Pt took tylenol before presenting to the hospital. Pt reports hx of tachycardia related to past cocaine use.

## 2024-11-19 LAB
C TRACH RRNA SPEC QL NAA+PROBE: SIGNIFICANT CHANGE UP
N GONORRHOEA RRNA SPEC QL NAA+PROBE: SIGNIFICANT CHANGE UP
SPECIMEN SOURCE: SIGNIFICANT CHANGE UP

## 2024-11-20 LAB
CULTURE RESULTS: SIGNIFICANT CHANGE UP
SPECIMEN SOURCE: SIGNIFICANT CHANGE UP

## 2024-12-02 ENCOUNTER — EMERGENCY (EMERGENCY)
Facility: HOSPITAL | Age: 29
LOS: 1 days | Discharge: HOME | End: 2024-12-02
Attending: EMERGENCY MEDICINE | Admitting: EMERGENCY MEDICINE
Payer: MEDICAID

## 2024-12-02 VITALS
TEMPERATURE: 98 F | SYSTOLIC BLOOD PRESSURE: 115 MMHG | RESPIRATION RATE: 17 BRPM | DIASTOLIC BLOOD PRESSURE: 79 MMHG | OXYGEN SATURATION: 96 % | WEIGHT: 172.4 LBS | HEART RATE: 104 BPM | HEIGHT: 70 IN

## 2024-12-02 VITALS — SYSTOLIC BLOOD PRESSURE: 117 MMHG | HEART RATE: 85 BPM | TEMPERATURE: 98 F | DIASTOLIC BLOOD PRESSURE: 76 MMHG

## 2024-12-02 PROCEDURE — 99283 EMERGENCY DEPT VISIT LOW MDM: CPT

## 2024-12-02 RX ORDER — LAMOTRIGINE 50 MG/1
25 TABLET, EXTENDED RELEASE ORAL ONCE
Refills: 0 | Status: COMPLETED | OUTPATIENT
Start: 2024-12-02 | End: 2024-12-02

## 2024-12-02 RX ORDER — VENLAFAXINE HYDROCHLORIDE 75 MG/1
1 CAPSULE, EXTENDED RELEASE ORAL
Qty: 30 | Refills: 0
Start: 2024-12-02 | End: 2024-12-31

## 2024-12-02 RX ORDER — LAMOTRIGINE 50 MG/1
1 TABLET, EXTENDED RELEASE ORAL
Qty: 90 | Refills: 0
Start: 2024-12-02 | End: 2024-12-31

## 2024-12-02 RX ORDER — LAMOTRIGINE 50 MG/1
25 TABLET, EXTENDED RELEASE ORAL ONCE
Refills: 0 | Status: ACTIVE | OUTPATIENT
Start: 2024-12-02 | End: 2024-12-02

## 2024-12-02 RX ADMIN — LAMOTRIGINE 25 MILLIGRAM(S): 50 TABLET, EXTENDED RELEASE ORAL at 15:52

## 2024-12-02 RX ADMIN — LAMOTRIGINE 25 MILLIGRAM(S): 50 TABLET, EXTENDED RELEASE ORAL at 16:34

## 2024-12-02 NOTE — ED PROVIDER NOTE - CARE PROVIDER_API CALL
Marianna Gould  Family Medicine  [address] [address]  Phone: [phone]  Fax: [phone]  Follow Up Time: 7-10 Days    Erick Griffin  Neurology  [address] [address]  Phone: [phone]  Fax: [phone]  Follow Up Time: 7-10 Days

## 2024-12-02 NOTE — ED PROVIDER NOTE - OBJECTIVE STATEMENT
29-year-old male with complaints of need for medication refill, patient takes venlafaxine and lamotrigine, staying at a sober living facility and requesting 1 month medications.

## 2024-12-02 NOTE — ED PROVIDER NOTE - PATIENT PORTAL LINK FT
You can access the FollowMyHealth Patient Portal offered by [facility] by registering at the following website: [email or web address]. By joining [facility]’s FollowMyHealth portal, you will also be able to view your health information using other applications (apps) compatible with our system.

## 2024-12-02 NOTE — ED PROVIDER NOTE - CARE PROVIDERS DIRECT ADDRESSES
,DirectAddress_Unknown,[email or web address]

## 2024-12-02 NOTE — ED PROVIDER NOTE - PROVIDER TOKENS
PROVIDER:[TOKEN:[[id number]],FOLLOWUP:[7-10 Days]],PROVIDER:[TOKEN:[[id number]],FOLLOWUP:[7-10 Days]]

## 2024-12-14 ENCOUNTER — EMERGENCY (EMERGENCY)
Facility: HOSPITAL | Age: 29
LOS: 1 days | Discharge: HOME | End: 2024-12-14
Attending: EMERGENCY MEDICINE
Payer: MEDICAID

## 2024-12-14 VITALS
SYSTOLIC BLOOD PRESSURE: 115 MMHG | RESPIRATION RATE: 16 BRPM | HEART RATE: 91 BPM | TEMPERATURE: 98 F | WEIGHT: 171.52 LBS | HEIGHT: 70 IN | OXYGEN SATURATION: 96 % | DIASTOLIC BLOOD PRESSURE: 74 MMHG

## 2024-12-14 PROCEDURE — 99284 EMERGENCY DEPT VISIT MOD MDM: CPT

## 2024-12-14 PROCEDURE — 71046 X-RAY EXAM CHEST 2 VIEWS: CPT | Mod: 26

## 2024-12-14 RX ORDER — ALBUTEROL 90 MCG
2 AEROSOL (GRAM) INHALATION ONCE
Refills: 0 | Status: COMPLETED | OUTPATIENT
Start: 2024-12-14 | End: 2024-12-14

## 2024-12-14 RX ORDER — ALBUTEROL 90 MCG
2.5 AEROSOL (GRAM) INHALATION ONCE
Refills: 0 | Status: DISCONTINUED | OUTPATIENT
Start: 2024-12-14 | End: 2024-12-14

## 2024-12-14 RX ADMIN — Medication 2 PUFF(S): at 18:47

## 2024-12-14 NOTE — ED PROVIDER NOTE - PATIENT PORTAL LINK FT
You can access the FollowMyHealth Patient Portal offered by [facility] by registering at the following website: [email or web address]. By joining [employer]’s FollowMyHealth portal, you will also be able to view your health information using other applications (apps) compatible with our system.

## 2024-12-14 NOTE — ED PROVIDER NOTE - OBJECTIVE STATEMENT
29-year-old male presents with persistent cough for several weeks associated with chest congestion.  No chest pain, shortness of breath, fevers, chills, other concerning symptoms reported.  Patient seen on 11/22/2024 for similar symptoms, diagnosed with pneumonia, and treated with Augmentin and azithromycin with some improvement of symptoms.  Patient vapes.

## 2024-12-14 NOTE — ED ADULT NURSE NOTE - NSFALLUNIVINTERV_ED_ALL_ED
Bed/Stretcher in lowest position, wheels locked, appropriate side rails in place/Call bell, personal items and telephone in reach/Instruct patient to call for assistance before getting out of bed/chair/stretcher/Non-slip footwear applied when patient is off stretcher/[place] to call system/Physically safe environment - no spills, clutter or unnecessary equipment/Purposeful proactive rounding/Room/bathroom lighting operational, light cord in reach

## 2024-12-14 NOTE — ED PROVIDER NOTE - PLAN OF CARE
IMPRESSION: 29-year-old male no significant past medical history with recent diagnosis of pneumonia status posttreatment antibiotics now presents ED with persistent cough likely due to bronchitis versus persistent pneumonia.  No clinical evidence of concern for other emergent medical conditions at this time.    PLAN:    # LABS: COVID / influenza  # IMAGING: CXR  # MEDICATIONS / THERAPEUTICS:  - Albuterol nebs -> MDI  - Abx PRN (eg respiratory fluroquinolone) if persistent pneumonia on CXR given concern for potential Abx failure   Further management and disposition as per ED work-up and clinical course.  # DISPOSITION:  Disposition decision determined by considering the complexity and severity of the patient presentation, comorbidities, current medical needs, the risk of an adverse outcome(s), and/or other social determinates of health.  If ED woke up unremarkable patient is significant proved, likely okay for discharge with outpatient follow-up and management.  - Rx: as above  - Instructions: Bronchitis  - F/U: PCP

## 2024-12-14 NOTE — ED ADULT TRIAGE NOTE - CHIEF COMPLAINT QUOTE
Patient walked in to welcome triage with c/o cough and feels like he has pneumonia. Was here about 2-3. Fiunished course of abx but still has cough

## 2024-12-14 NOTE — ED PROVIDER NOTE - CARE PLAN
Assessment and plan of treatment:	IMPRESSION: 29-year-old male no significant past medical history with recent diagnosis of pneumonia status posttreatment antibiotics now presents ED with persistent cough likely due to bronchitis versus persistent pneumonia.  No clinical evidence of concern for other emergent medical conditions at this time.    PLAN:    # LABS: COVID / influenza  # IMAGING: CXR  # MEDICATIONS / THERAPEUTICS:  - Albuterol nebs -> MDI  - Abx PRN (eg respiratory fluroquinolone) if persistent pneumonia on CXR given concern for potential Abx failure   Further management and disposition as per ED work-up and clinical course.  # DISPOSITION:  Disposition decision determined by considering the complexity and severity of the patient presentation, comorbidities, current medical needs, the risk of an adverse outcome(s), and/or other social determinates of health.  If ED woke up unremarkable patient is significant proved, likely okay for discharge with outpatient follow-up and management.  - Rx: as above  - Instructions: Bronchitis  - F/U: PCP   1 Principal Discharge DX:	Bronchitis  Assessment and plan of treatment:	IMPRESSION: 29-year-old male no significant past medical history with recent diagnosis of pneumonia status posttreatment antibiotics now presents ED with persistent cough likely due to bronchitis versus persistent pneumonia.  No clinical evidence of concern for other emergent medical conditions at this time.    PLAN:    # LABS: COVID / influenza  # IMAGING: CXR  # MEDICATIONS / THERAPEUTICS:  - Albuterol nebs -> MDI  - Abx PRN (eg respiratory fluroquinolone) if persistent pneumonia on CXR given concern for potential Abx failure   Further management and disposition as per ED work-up and clinical course.  # DISPOSITION:  Disposition decision determined by considering the complexity and severity of the patient presentation, comorbidities, current medical needs, the risk of an adverse outcome(s), and/or other social determinates of health.  If ED woke up unremarkable patient is significant proved, likely okay for discharge with outpatient follow-up and management.  - Rx: as above  - Instructions: Bronchitis  - F/U: PCP

## 2024-12-14 NOTE — ED PROVIDER NOTE - PHYSICAL EXAMINATION
Gen: WD WN non-toxic resting comfortably in NAD  HEENT: NCAT, PERRL EOMI O/P: MMM  Neck: supple, NT   Chest: RRR no M/R/G noted  Resp: CTAB with some coarse breath sounds bilateral lower lungs, no R/R/W noted  Neuro: AAOx3 grossly intact without focality

## 2024-12-14 NOTE — ED PROVIDER NOTE - NSFOLLOWUPINSTRUCTIONS_ED_ALL_ED_FT
Follow-up with your doctor within 1 week or sooner if symptoms persist or worsen.    Use albuterol inhaler 2 puffs every 4 hours as needed as directed.    Return to ER for fevers, chills, persistent cough, chest pain, shortness of breath or continued worsening symptoms, or for any concerns.    --------    Acute Bronchitis, Adult  A comparison between normal and swollen airways, or bronchi, in the lungs.  Acute bronchitis is sudden inflammation of the main airways (bronchi) that come off the windpipe (trachea) in the lungs. The swelling causes the airways to get smaller and make more mucus than normal. This can make it hard to breathe and can cause coughing or noisy breathing (wheezing).    Acute bronchitis may last several weeks. The cough may last longer. Allergies, asthma, and exposure to smoke may make the condition worse.    What are the causes?  This condition can be caused by germs and by substances that irritate the lungs, including:  Cold and flu viruses. The most common cause of this condition is the virus that causes the common cold.  Bacteria. This is less common.  Breathing in substances that irritate the lungs, including:  Smoke from cigarettes and other forms of tobacco.  Dust and pollen.  Fumes from household cleaning products, gases, or burned fuel.  Indoor or outdoor air pollution.  What increases the risk?  The following factors may make you more likely to develop this condition:  A weak body's defense system, also called the immune system.  A condition that affects your lungs and breathing, such as asthma.  What are the signs or symptoms?  Common symptoms of this condition include:  Coughing. This may bring up clear, yellow, or green mucus from your lungs (sputum).  Wheezing.  Runny or stuffy nose.  Having too much mucus in your lungs (chest congestion).  Shortness of breath.  Aches and pains, including sore throat or chest.  How is this diagnosed?  This condition is usually diagnosed based on:  Your symptoms and medical history.  A physical exam.  You may also have other tests, including tests to rule out other conditions, such as pneumonia. These tests include:  A test of lung function.  Test of a mucus sample to look for the presence of bacteria.  Tests to check the oxygen level in your blood.  Blood tests.  Chest X-ray.  How is this treated?  Most cases of acute bronchitis clear up over time without treatment. Your health care provider may recommend:  Drinking more fluids to help thin your mucus so it is easier to cough up.  Taking inhaled medicine (inhaler) to improve air flow in and out of your lungs.  Using a vaporizer or a humidifier. These are machines that add water to the air to help you breathe better.  Taking a medicine that thins mucus and clears congestion (expectorant).  Taking a medicine that prevents or stops coughing (cough suppressant).  It is not common to take an antibiotic medicine for this condition.    Follow these instructions at home:  A do not smoke cigarettes sign.  Take over-the-counter and prescription medicines only as told by your health care provider.  Use an inhaler, vaporizer, or humidifier as told by your health care provider.  Take two teaspoons (10 mL) of honey at bedtime to lessen coughing at night.  Drink enough fluid to keep your urine pale yellow.  Do not use any products that contain nicotine or tobacco. These products include cigarettes, chewing tobacco, and vaping devices, such as e-cigarettes. If you need help quitting, ask your health care provider.  Get plenty of rest.  Return to your normal activities as told by your health care provider. Ask your health care provider what activities are safe for you.  Keep all follow-up visits. This is important.  How is this prevented?  Washing hands with soap and water.  To lower your risk of getting this condition again:  Wash your hands often with soap and water for at least 20 seconds. If soap and water are not available, use hand .  Avoid contact with people who have cold symptoms.  Try not to touch your mouth, nose, or eyes with your hands.  Avoid breathing in smoke or chemical fumes. Breathing smoke or chemical fumes will make your condition worse.  Get the flu shot every year.  Contact a health care provider if:  Your symptoms do not improve after 2 weeks.  You have trouble coughing up the mucus.  Your cough keeps you awake at night.  You have a fever.  Get help right away if you:  Cough up blood.  Feel pain in your chest.  Have severe shortness of breath.  Faint or keep feeling like you are going to faint.  Have a severe headache.  Have a fever or chills that get worse.  These symptoms may represent a serious problem that is an emergency. Do not wait to see if the symptoms will go away. Get medical help right away. Call your local emergency services (911 in the [place] Do not drive yourself to the hospital.    Summary  Acute bronchitis is inflammation of the main airways (bronchi) that come off the windpipe (trachea) in the lungs. The swelling causes the airways to get smaller and make more mucus than normal.  Drinking more fluids can help thin your mucus so it is easier to cough up.  Take over-the-counter and prescription medicines only as told by your health care provider.  Do not use any products that contain nicotine or tobacco. These products include cigarettes, chewing tobacco, and vaping devices, such as e-cigarettes. If you need help quitting, ask your health care provider.  Contact a health care provider if your symptoms do not improve after 2 weeks.

## 2024-12-14 NOTE — ED ADULT NURSE NOTE - AVIAN FLU EXPOSURE
Received call from Emergency Room.  Brittany seen for concerns of fever.  Had been started on Levaquin and inhaled JANIE prior for concerns of increased, foul smelling secretions.  This has significantly improved.  Continued on antibiotics and spiked a fever today to 102.  Brought to ED for evaluation.  Tolerating feeds.  Seems a bit more tired to parents but otherwise no change.  Chest xray with no change.  Looks good one exam.  Discussed continuing antibiotics and follow-up by phone early in the week.  Will pass on to Dr. España.    Radha Dahl MD MSCS  Pediatric Pulmonology   No

## 2024-12-14 NOTE — ED ADULT NURSE NOTE - OBJECTIVE STATEMENT
Pt is a 29y M c/o of cough. pt states that's he has a productive cough for a 3 days. Pt states that three weeks ago he had pneumonia and is concerned that's he has it again. Pt states that he finished the course of his antibiotics and he feels no relief. Pt denies SOB, N/V/D, fever, chills.  Pt A&Ox4, speaking in full sentences. NAD

## 2024-12-16 DIAGNOSIS — J40 BRONCHITIS, NOT SPECIFIED AS ACUTE OR CHRONIC: ICD-10-CM

## 2025-01-04 ENCOUNTER — EMERGENCY (EMERGENCY)
Facility: HOSPITAL | Age: 30
LOS: 1 days | Discharge: HOME | End: 2025-01-04
Attending: EMERGENCY MEDICINE | Admitting: EMERGENCY MEDICINE
Payer: MEDICAID

## 2025-01-04 VITALS
DIASTOLIC BLOOD PRESSURE: 79 MMHG | RESPIRATION RATE: 17 BRPM | WEIGHT: 178.57 LBS | SYSTOLIC BLOOD PRESSURE: 114 MMHG | HEART RATE: 95 BPM | HEIGHT: 70 IN | OXYGEN SATURATION: 98 % | TEMPERATURE: 98 F

## 2025-01-04 DIAGNOSIS — L03.115 CELLULITIS OF RIGHT LOWER LIMB: ICD-10-CM

## 2025-01-04 PROCEDURE — 99283 EMERGENCY DEPT VISIT LOW MDM: CPT

## 2025-01-04 RX ORDER — MUPIROCIN 2 %
1 OINTMENT (GRAM) TOPICAL
Qty: 1 | Refills: 2
Start: 2025-01-04 | End: 2025-01-24

## 2025-01-04 RX ORDER — SULFAMETHOXAZOLE/TRIMETHOPRIM 800-160 MG
1 TABLET ORAL
Qty: 14 | Refills: 0
Start: 2025-01-04 | End: 2025-01-10

## 2025-01-04 RX ORDER — AMOXICILLIN/POTASSIUM CLAV 875-125 MG
875 TABLET ORAL
Qty: 14 | Refills: 0
Start: 2025-01-04 | End: 2025-01-10

## 2025-01-04 RX ORDER — AMOXICILLIN/POTASSIUM CLAV 875-125 MG
1 TABLET ORAL ONCE
Refills: 0 | Status: COMPLETED | OUTPATIENT
Start: 2025-01-04 | End: 2025-01-04

## 2025-01-04 RX ORDER — SULFAMETHOXAZOLE/TRIMETHOPRIM 800-160 MG
1 TABLET ORAL ONCE
Refills: 0 | Status: COMPLETED | OUTPATIENT
Start: 2025-01-04 | End: 2025-01-04

## 2025-01-04 RX ORDER — BACITRACIN 500 UNIT/G
1 OINTMENT (GRAM) TOPICAL ONCE
Refills: 0 | Status: COMPLETED | OUTPATIENT
Start: 2025-01-04 | End: 2025-01-04

## 2025-01-04 RX ADMIN — Medication 1 APPLICATION(S): at 13:24

## 2025-01-04 RX ADMIN — Medication 1 TABLET(S): at 13:23

## 2025-01-04 RX ADMIN — Medication 1 TABLET(S): at 13:24

## 2025-01-04 NOTE — ED PROVIDER NOTE - PATIENT PORTAL LINK FT
You can access the FollowMyHealth Patient Portal offered by [facility] by registering at the following website: [email or web address]. By joining [employer]’s FollowMyHealth portal, you will also be able to view your health information using other applications (apps) compatible with our system.

## 2025-01-04 NOTE — ED PROVIDER NOTE - NSFOLLOWUPINSTRUCTIONS_ED_ALL_ED_FT
Return for a wound check in 2 days.  Use the two oral and one topical antibiotic twice a day for a week.    Cellulitis, Adult  A person's legs and feet. One leg is normal and the other leg is affected by cellulitis.  Cellulitis is a skin infection. The infected area is usually warm, red, swollen, and tender. It most commonly occurs on the lower body, such as the legs, feet, and toes, but this condition can occur on any part of the body. The infection can travel to the muscles, blood, and underlying tissue and become life-threatening without treatment. It is important to get medical treatment right away for this condition.    What are the causes?  Cellulitis is caused by bacteria. The bacteria enter through a break in the skin, such as a cut, burn, insect or animal bite, open sore, or crack.    What increases the risk?  This condition is more likely to occur in people who:  Have a weak body's defense system (immune system).  Are older than 60 years old.  Have diabetes.  Have a type of long-term (chronic) liver disease (cirrhosis) or kidney disease.  Are obese.  Have a skin condition such as:  An itchy rash, such as eczema or psoriasis.  A fungal rash on the feet or in skinfolds.  Blistering rashes, such as shingles or chickenpox.  Slow movement of blood in the veins (venous stasis).  Fluid buildup below the skin (edema).  Have open wounds on the skin, such as cuts, puncture wounds, burns, bites, scrapes, tattoos, piercings, or wounds from surgery.  Have had radiation therapy.  Use IV drugs.  What are the signs or symptoms?  Symptoms of this condition include:  Skin that looks red, purple, or slightly darker than your usual skin color.  Streaks or spots on the skin.  Swollen area of the skin.  Tenderness or pain when an area of the skin is touched.  Warm skin.  Fever or chills.  Blisters.  Tiredness (fatigue).  How is this diagnosed?  This condition is diagnosed based on a medical history and physical exam. You may also have tests, including:  Blood tests.  Imaging tests.  Tests on a sample of fluid taken from the wound (wound culture).  How is this treated?  Treatment for this condition may include:  Medicines. These may include antibiotics or medicines to treat allergies (antihistamines).  Rest.  Applying cold or warm wet cloths (compresses) to the skin.  If the condition is severe, you may need to stay in the hospital and get antibiotics through an IV.  The infection usually starts to get better within 1–2 days of treatment.    Follow these instructions at home:  Medicines    Take over-the-counter and prescription medicines only as told by your health care provider.  If you were prescribed antibiotics, take them as told by your provider. Do not stop using the antibiotic even if you start to feel better.  General instructions    Drink enough fluid to keep your pee (urine) pale yellow.  Do not touch or rub the infected area.  Raise (elevate) the infected area above the level of your heart while you are sitting or lying down.  Return to your normal activities as told by your provider. Ask your provider what activities are safe for you.  Apply warm or cold compresses to the affected area as told by your provider.  Keep all follow-up visits. Your provider will need to make sure that a more serious infection is not developing.  Contact a health care provider if:  You have a fever.  Your symptoms do not improve within 1–2 days of starting treatment or you develop new symptoms.  Your bone or joint underneath the infected area becomes painful after the skin has healed.  Your infection returns in the same area or another area. Signs of this may include:  You notice a swollen bump in the infected area.  Your red area gets larger, turns dark in color, or becomes more painful.  Drainage increases.  Pus or a bad smell develops in your infected area.  You have more pain.  You feel ill and have muscle aches and weakness.  You develop vomiting or diarrhea that will not go away.  Get help right away if:  You notice red streaks coming from the infected area.  You notice the skin turns purple or black and falls off.  This symptom may be an emergency. Get help right away. Call 911.  Do not wait to see if the symptom will go away.  Do not drive yourself to the hospital.

## 2025-01-04 NOTE — ED ADULT TRIAGE NOTE - CHIEF COMPLAINT QUOTE
patient walk in complaining his tattoo is infected to right upper thigh; made his own tattoo with own needles and tools; redness and swelling

## 2025-01-04 NOTE — ED ADULT NURSE NOTE - OBJECTIVE STATEMENT
Pt presents to ED A&Ox4 with c/o right upper thigh pain and scabbing. Pt notes experiencing symptoms after getting tattoo several days ago. Denies fever/chills, dizziness, LOC. Upon inspection, redness present around affected area. Denies PMH or prescribed medications. Denies drug or EtOH use.

## 2025-01-04 NOTE — ED PROVIDER NOTE - CLINICAL SUMMARY MEDICAL DECISION MAKING FREE TEXT BOX
Anesthesia Volume In Cc (Will Not Render If 0): 1 Anterior right thigh cellulitis after getting tattoo few days ago.  There is redness surrounding the tattoo.  No fevers or chills.      Bactroban topical Bactrim and Augmentin p.o.  Wound check in 2 days.

## 2025-01-04 NOTE — ED PROVIDER NOTE - OBJECTIVE STATEMENT
Anterior right thigh cellulitis after getting tattoo few days ago.  There is redness surrounding the tattoo.  No fevers or chills.

## 2025-01-22 NOTE — ED ADULT NURSE NOTE - ISOLATION TYPE:
Hx Von Willebrand    Pt started with nose bleed at 0805, notified mom after 10 minutes of nonstop bleeding. Mom states pt soaked through approx 10-15 tissues with blood.     Bleeding controlled at this time, slowed down within the last 5 mins per mom. No active bleeding in triage.   
No
None

## 2025-02-06 ENCOUNTER — EMERGENCY (EMERGENCY)
Facility: HOSPITAL | Age: 30
LOS: 1 days | Discharge: HOME | End: 2025-02-06
Attending: STUDENT IN AN ORGANIZED HEALTH CARE EDUCATION/TRAINING PROGRAM | Admitting: EMERGENCY MEDICINE
Payer: MEDICAID

## 2025-02-06 VITALS
TEMPERATURE: 98 F | HEIGHT: 70 IN | RESPIRATION RATE: 16 BRPM | DIASTOLIC BLOOD PRESSURE: 70 MMHG | OXYGEN SATURATION: 97 % | HEART RATE: 90 BPM | SYSTOLIC BLOOD PRESSURE: 121 MMHG

## 2025-02-06 PROCEDURE — 99283 EMERGENCY DEPT VISIT LOW MDM: CPT

## 2025-02-06 RX ORDER — LAMOTRIGINE 100 MG/1
1 TABLET ORAL
Qty: 180 | Refills: 0
Start: 2025-02-06 | End: 2025-05-06

## 2025-02-06 RX ORDER — LAMOTRIGINE 100 MG/1
25 TABLET ORAL ONCE
Refills: 0 | Status: COMPLETED | OUTPATIENT
Start: 2025-02-06 | End: 2025-02-06

## 2025-02-06 RX ORDER — VENLAFAXINE HCL 75 MG
37.5 TABLET ORAL ONCE
Refills: 0 | Status: COMPLETED | OUTPATIENT
Start: 2025-02-06 | End: 2025-02-06

## 2025-02-06 RX ORDER — VENLAFAXINE HCL 75 MG
1 TABLET ORAL
Qty: 30 | Refills: 0
Start: 2025-02-06 | End: 2025-03-07

## 2025-02-06 RX ADMIN — Medication 37.5 MILLIGRAM(S): at 19:43

## 2025-02-06 RX ADMIN — LAMOTRIGINE 25 MILLIGRAM(S): 100 TABLET ORAL at 19:43

## 2025-02-06 NOTE — ED PROVIDER NOTE - PATIENT PORTAL LINK FT
You can access the FollowMyHealth Patient Portal offered by [facility] by registering at the following website: [email or web address]. By joining [employer]’s FollowMyHealth portal, you will also be able to view your health information using other applications (apps) compatible with our system.

## 2025-02-06 NOTE — ED ADULT NURSE NOTE - NSFALLRISK_ED_ALL_ED
Fariba called back and requested a refill of her Diltiazem 240 mg / take 1 tablet daily / 90 day supply / [facility].  
Last office visit: 11/14/2019    Next scheduled/on recall list: 11/13/2020    Medication/dose: diltiazem HCI ER coated beads 240 mg  Take 1 capsule by mouth daily     Quantity/#refills: #30 with 5 refills     Refill request completed? Yes    
Per Julio, Fariba called requesting a refill but not leave the name of the medication. I left a message for Fariba to call back to get that information.  
No

## 2025-02-06 NOTE — ED ADULT NURSE NOTE - OBJECTIVE STATEMENT
Received patient alert and oriented x 4 denies chest pain or SOB, no cardiac or respiratory distress noted. Patient stated that reason for ER visit is due to needing his medication refill. Patient is currently stable

## 2025-02-06 NOTE — ED PROVIDER NOTE - OBJECTIVE STATEMENT
29 M presents for medication refill.    GENERAL APPEARANCE:  AAOx3, generally well-appearing, no acute distress. Appears stated age.  HEENT:  NCAT. Moist mucous membranes. EOMI, clear conjunctiva, no slceral icterus, oropharynx clear.  NECK:  Supple without lymphadenopathy. No JVD.  No neck stiffness or restricted ROM.  HEART:  Normal heart rate and regular rhythm. No murmur.   LUNGS:  CTAB, moving air well. No crackles or wheezes are heard.  ABDOMEN:  Soft, nontender, non-distended. Negative Brandt. Negative McBurney. No rebound or guarding.  CHEST/BACK: Chest wall non-tender. No CVAT, or midline cervical/thoracic/lumbar tenderness to palpation. No obvious deformity of chest wall or back.  EXTREMITIES:  Without cyanosis, clubbing or edema. Pulse intact x 4. FROM x4. Compartments soft in all extremities.  NEUROLOGICAL:  Grossly non-focal. Alert and oriented, moving all 4 extremities. CN II-XII grossly intact. Observed to ambulate with normal gait.  SKIN:  Warm and dry without any rash.  PSYCH: Calm, cooperative. Normal mood and affect. Demonstrates proper insight and judgement.

## 2025-02-06 NOTE — ED ADULT NURSE NOTE - NSFALLUNIVINTERV_ED_ALL_ED
Bed/Stretcher in lowest position, wheels locked, appropriate side rails in place/Call bell, personal items and telephone in reach/Instruct patient to call for assistance before getting out of bed/chair/stretcher/Non-slip footwear applied when patient is off stretcher/[place] to call system/Physically safe environment - no spills, clutter or unnecessary equipment/Purposeful proactive rounding/Room/bathroom lighting operational, light cord in reach

## 2025-02-06 NOTE — ED PROVIDER NOTE - NS ED MD DISPO DISCHARGE CCDA
Pt advised to go to nearest Emergency room. Patient/Caregiver provided printed discharge information.

## 2025-02-06 NOTE — ED PROVIDER NOTE - PHYSICAL EXAMINATION
GENERAL APPEARANCE:  AAOx3, generally well-appearing, no acute distress. Appears stated age.  HEENT:  NCAT. Moist mucous membranes. EOMI, clear conjunctiva, no slceral icterus, oropharynx clear.  NECK:  Supple without lymphadenopathy. No JVD.  No neck stiffness or restricted ROM.  HEART:  Normal heart rate and regular rhythm. No murmur.   LUNGS:  CTAB, moving air well. No crackles or wheezes are heard.  ABDOMEN:  Soft, nontender, non-distended. Negative Brandt. Negative McBurney. No rebound or guarding.  CHEST/BACK: Chest wall non-tender. No CVAT, or midline cervical/thoracic/lumbar tenderness to palpation. No obvious deformity of chest wall or back.  EXTREMITIES:  Without cyanosis, clubbing or edema. Pulse intact x 4. FROM x4. Compartments soft in all extremities.  NEUROLOGICAL:  Grossly non-focal. Alert and oriented, moving all 4 extremities. CN II-XII grossly intact. Observed to ambulate with normal gait.  SKIN:  Warm and dry without any rash.  PSYCH: Calm, cooperative. Normal mood and affect. Demonstrates proper insight and judgement.

## 2025-02-06 NOTE — ED PROVIDER NOTE - NSFOLLOWUPINSTRUCTIONS_ED_ALL_ED_FT
Medicines are a big part of treatment for many health problems. So it can be upsetting to run out of your medicine. It may even be dangerous to stop a medicine suddenly. If you were given a prescription during a hospital or emergency room visit, the doctor may have given you enough medicine to help you until you can see your regular doctor.    Be safe with medicines. Take your medicines exactly as prescribed.  Know when you will run out of your medicine. Use a calendar to remind you to get a refill. Don't wait until you have only a few pills left.  Talk with your doctor or pharmacist about your medicine. Find out what to do if you miss a dose.

## 2025-03-16 ENCOUNTER — EMERGENCY (EMERGENCY)
Facility: HOSPITAL | Age: 30
LOS: 1 days | Discharge: HOME | End: 2025-03-16
Attending: EMERGENCY MEDICINE | Admitting: EMERGENCY MEDICINE
Payer: MEDICAID

## 2025-03-16 VITALS
TEMPERATURE: 99 F | DIASTOLIC BLOOD PRESSURE: 68 MMHG | OXYGEN SATURATION: 95 % | RESPIRATION RATE: 18 BRPM | WEIGHT: 164.91 LBS | HEIGHT: 70 IN | SYSTOLIC BLOOD PRESSURE: 124 MMHG | HEART RATE: 95 BPM

## 2025-03-16 PROCEDURE — 99284 EMERGENCY DEPT VISIT MOD MDM: CPT

## 2025-03-16 PROCEDURE — 71046 X-RAY EXAM CHEST 2 VIEWS: CPT | Mod: 26

## 2025-03-16 RX ORDER — METHYLPREDNISOLONE ACETATE 80 MG/ML
1 INJECTION, SUSPENSION INTRA-ARTICULAR; INTRALESIONAL; INTRAMUSCULAR; SOFT TISSUE
Qty: 1 | Refills: 0
Start: 2025-03-16 | End: 2025-03-20

## 2025-03-16 RX ORDER — AZITHROMYCIN 250 MG
1 CAPSULE ORAL
Qty: 6 | Refills: 0
Start: 2025-03-16 | End: 2025-03-20

## 2025-03-16 RX ORDER — BENZONATATE 100 MG
1 CAPSULE ORAL
Qty: 20 | Refills: 0
Start: 2025-03-16

## 2025-03-16 RX ORDER — BUDESONIDE AND FORMOTEROL FUMARATE DIHYDRATE 80; 4.5 UG/1; UG/1
2 AEROSOL RESPIRATORY (INHALATION)
Qty: 1 | Refills: 0
Start: 2025-03-16 | End: 2025-04-14

## 2025-03-16 RX ORDER — ALBUTEROL SULFATE 2.5 MG/3ML
2 VIAL, NEBULIZER (ML) INHALATION
Qty: 1 | Refills: 0
Start: 2025-03-16

## 2025-03-16 NOTE — ED PROVIDER NOTE - OBJECTIVE STATEMENT
29-year-old male presenting with worsening cough for the last 1.5 weeks.  It started off with a cold about a month ago.  He saw his primary care doctor who did a COVID and flu test on him, it was negative.  He has been using Mucinex intermittently as well as Symbicort which helps.  He is only taking that sporadically.  He is concerned that he may have a bacterial infection.  He does not have a diagnosis of asthma but has had bronchitis following upper respiratory infections in the past.  He currently vapes nicotine frequently but says that he is sober from many other drugs for the past 8 months.  He said no fevers or chills.

## 2025-03-16 NOTE — ED PROVIDER NOTE - CLINICAL SUMMARY MEDICAL DECISION MAKING FREE TEXT BOX
29-year-old male presenting with concerns for bronchitis and bacterial infection in his lungs as he has had a persistent cough for the last week and 1/2 to 2 weeks following a URI.  He is a heavy nicotine vapor and has been sober from polysubstance use for the last 8 months.  He seems to be improving with Symbicort as an outpatient.  Will check a chest x-ray to evaluate for pneumonia.  He reports having had pneumonia a few months ago.  Anticipate discharge with a [facility]-[place] if the chest x-ray is clear.

## 2025-03-16 NOTE — ED PROVIDER NOTE - NSICDXPASTMEDICALHX_GEN_ALL_CORE_FT
PAST MEDICAL HISTORY:  Nicotine dependence due to vaping non-tobacco product     Substance abuse in remission Polysubstance abuse, in remission since summer 2024     1

## 2025-03-16 NOTE — ED PROVIDER NOTE - PHYSICAL EXAMINATION
VITAL SIGNS: I have reviewed nursing notes and confirm.  CONST: Well-developed; well-nourished; No apparent distress.  ENT: No nasal discharge; airway clear.  EYES: Sclera clear. Pupils round and symmetrical bilaterally.  RESP: Breathing comfortably; speaking in full sentences. Lungs clear.   NEURO: Alert, oriented. Speech is fluent and appropriate. Moving all extremities appropriately. No gross abnormalities.  SKIN: Skin is normal temperature; no diaphoresis; no pallor.  PSYCH: Cooperative. Appropriate mood, language, and behavior.

## 2025-03-16 NOTE — ED PROVIDER NOTE - NSFOLLOWUPINSTRUCTIONS_ED_ALL_ED_FT
Acute Bronchitis    You are being prescribed azithromycin, an antibiotic for 5 days, a Medrol Dosepak, this is a steroid taper in order to decrease airway inflammation, Tessalon Perles 200 mg every 6 hours as needed for cough.  This is dissolvable numbing medicine.  Do not chew the capsule.  You can take every 6 hours to decrease the tingliness of the cough.  You can continue to take over-the-counter Mucinex.    WHAT YOU NEED TO KNOW:    Acute bronchitis is swelling and irritation in the air passages of your lungs. This irritation may cause you to cough or have other breathing problems. Acute bronchitis often starts because of another illness, such as a cold or the flu. The illness spreads from your nose and throat to your windpipe and airways. Bronchitis is often called a chest cold. Acute bronchitis lasts about 3 to 6 weeks and is usually not a serious illness. Your cough can last for several weeks.     DISCHARGE INSTRUCTIONS:    Return to the emergency department if:     You cough up blood.      Your lips or fingernails turn blue.      You feel like you are not getting enough air when you breathe.    Contact your healthcare provider if:     You have a fever.      Your breathing problems do not go away or get worse.      Your cough does not get better within 4 weeks.      You have questions or concerns about your condition or care.    Self-care:     Get more rest. Rest helps your body to heal. Slowly start to do more each day. Rest when you feel it is needed.      Avoid irritants in the air. Avoid chemicals, fumes, and dust. Wear a face mask if you must work around dust or fumes. Stay inside on days when air pollution levels are high. If you have allergies, stay inside when pollen counts are high. Do not use aerosol products, such as spray-on deodorant, bug spray, and hair spray.      Do not smoke or be around others who smoke. Nicotine and other chemicals in cigarettes and cigars damages the cilia that move mucus out of your lungs. Ask your healthcare provider for information if you currently smoke and need help to quit. E-cigarettes or smokeless tobacco still contain nicotine. Talk to your healthcare provider before you use these products.       Drink liquids as directed. Liquids help keep your air passages moist and help you cough up mucus. You may need to drink more liquids when you have acute bronchitis. Ask how much liquid to drink each day and which liquids are best for you.      Use a humidifier or vaporizer. Use a cool mist humidifier or a vaporizer to increase air moisture in your home. This may make it easier for you to breathe and help decrease your cough.     Decrease risk for acute bronchitis:     Get the vaccinations you need. Ask your healthcare provider if you should get vaccinated against the flu or pneumonia.      Prevent the spread of germs. You can decrease your risk of acute bronchitis and other illnesses by doing the following:   Wash your hands often with soap and water. Carry germ-killing hand lotion or gel with you. You can use the lotion or gel to clean your hands when soap and water are not available.      Do not touch your eyes, nose, or mouth unless you have washed your hands first.      Always cover your mouth when you cough to prevent the spread of germs. It is best to cough into a tissue or your shirt sleeve instead of into your hand. Ask those around you cover their mouths when they cough.      Try to avoid people who have a cold or the flu. If you are sick, stay away from others as much as possible.    Medicines: Your healthcare provider may give you any of the following:     Ibuprofen or acetaminophen are medicines that help lower your fever. They are available without a doctor's order. Ask your healthcare provider which medicine is right for you. Ask how much to take and how often to take it. Follow directions. These medicines can cause stomach bleeding if not taken correctly. Ibuprofen can cause kidney damage. Do not take ibuprofen if you have kidney disease, an ulcer, or allergies to aspirin. Acetaminophen can cause liver damage. Do not take more than 4,000 milligrams in 24 hours.       Decongestants help loosen mucus in your lungs and make it easier to cough up. This can help you breathe easier.      Cough suppressants decrease your urge to cough. If your cough produces mucus, do not take a cough suppressant unless your healthcare provider tells you to. Your healthcare provider may suggest that you take a cough suppressant at night so you can rest.      Inhalers may be given. Your healthcare provider may give you one or more inhalers to help you breathe easier and cough less. An inhaler gives your medicine to open your airways. Ask your healthcare provider to show you how to use your inhaler correctly.Metered Dose Inhaler      Take your medicine as directed. Contact your healthcare provider if you think your medicine is not helping or if you have side effects. Tell him of her if you are allergic to any medicine. Keep a list of the medicines, vitamins, and herbs you take. Include the amounts, and when and why you take them. Bring the list or the pill bottles to follow-up visits. Carry your medicine list with you in case of an emergency.    Follow up with your healthcare provider as directed: Write down questions you have so you will remember to ask them during your follow-up visits

## 2025-03-16 NOTE — ED ADULT NURSE NOTE - DOES PATIENT HAVE ADVANCE DIRECTIVE
Telephone Encounter by Paula Bernal RN at 12/13/17 08:30 AM     Author:  Paula Bernal RN Service:  (none) Author Type:  Registered Nurse     Filed:  12/13/17 08:30 AM Encounter Date:  12/12/2017 Status:  Signed     :  Paula Bernal RN (Registered Nurse)            Msg to Hebrew apprupt.  Please check with pt regarding any upper or lower GI symptoms.[JV1.1M]  Electronically Signed by:    Paula Bernal RN , 12/13/2017[JV1.1T]        Revision History        User Key Date/Time User Provider Type Action    > JV1.1 12/13/17 08:30 AM Paula Bernal RN Registered Nurse Sign    M - Manual, T - Template             No

## 2025-03-16 NOTE — ED PROVIDER NOTE - PATIENT PORTAL LINK FT
You can access the FollowMyHealth Patient Portal offered by [facility] by registering at the following website: [email or web address]. By joining [employer]’s FollowMyHealth portal, you will also be able to view your health information using other applications (apps) compatible with our system.

## 2025-03-16 NOTE — ED ADULT NURSE NOTE - NSFALLUNIVINTERV_ED_ALL_ED
Bed/Stretcher in lowest position, wheels locked, appropriate side rails in place/Call bell, personal items and telephone in reach/Instruct patient to call for assistance before getting out of bed/chair/stretcher/Non-slip footwear applied when patient is off stretcher/[place] to call system/Physically safe environment - no spills, clutter or unnecessary equipment/Purposeful proactive rounding/Room/bathroom lighting operational, light cord in reach

## 2025-03-16 NOTE — ED ADULT NURSE NOTE - OBJECTIVE STATEMENT
Pt presents to ed ambulatory, Pt AOX4, speaking in full clear sentences, breathing equal and unlabored  Pt walks in c/o productive cough (green phlegm) for 4 weeks.  Swab sent  Pt reports hx of excessive vaping and frequent bronchitis

## 2025-03-17 RX ORDER — BUDESONIDE AND FORMOTEROL FUMARATE DIHYDRATE 80; 4.5 UG/1; UG/1
2 AEROSOL RESPIRATORY (INHALATION)
Qty: 1 | Refills: 0
Start: 2025-03-17 | End: 2025-04-15

## 2025-03-17 RX ORDER — BENZONATATE 100 MG
1 CAPSULE ORAL
Qty: 20 | Refills: 0
Start: 2025-03-17

## 2025-03-17 RX ORDER — ALBUTEROL SULFATE 2.5 MG/3ML
2 VIAL, NEBULIZER (ML) INHALATION
Qty: 1 | Refills: 0
Start: 2025-03-17

## 2025-03-17 RX ORDER — METHYLPREDNISOLONE ACETATE 80 MG/ML
1 INJECTION, SUSPENSION INTRA-ARTICULAR; INTRALESIONAL; INTRAMUSCULAR; SOFT TISSUE
Qty: 1 | Refills: 0
Start: 2025-03-17 | End: 2025-03-21

## 2025-03-17 RX ORDER — AZITHROMYCIN 250 MG
1 CAPSULE ORAL
Qty: 6 | Refills: 0
Start: 2025-03-17 | End: 2025-03-21

## 2025-03-17 NOTE — ED POST DISCHARGE NOTE - OTHER COMMUNICATION
Patient lost his medication and asked for it to be resent. Informed that his insurance might not pay for it.

## 2025-03-18 DIAGNOSIS — J40 BRONCHITIS, NOT SPECIFIED AS ACUTE OR CHRONIC: ICD-10-CM

## 2025-03-18 DIAGNOSIS — R05.1 ACUTE COUGH: ICD-10-CM

## 2025-03-18 DIAGNOSIS — F17.290 NICOTINE DEPENDENCE, OTHER TOBACCO PRODUCT, UNCOMPLICATED: ICD-10-CM

## 2025-04-15 NOTE — ED PROVIDER NOTE - TOBACCO USE
Blood pressure elevated in the office.  Patient does not have a blood pressure machine at home.  He is going to invest in 1 and send us blood pressure log if it remains elevated average is high then we will start him on medication.  Salt restriction advised and exercise regularly   Unknown if ever smoked